# Patient Record
Sex: FEMALE | Race: WHITE | Employment: UNEMPLOYED | URBAN - METROPOLITAN AREA
[De-identification: names, ages, dates, MRNs, and addresses within clinical notes are randomized per-mention and may not be internally consistent; named-entity substitution may affect disease eponyms.]

---

## 2017-04-24 ENCOUNTER — OFFICE VISIT (OUTPATIENT)
Dept: FAMILY MEDICINE CLINIC | Age: 47
End: 2017-04-24

## 2017-04-24 VITALS
HEIGHT: 69 IN | SYSTOLIC BLOOD PRESSURE: 101 MMHG | HEART RATE: 71 BPM | DIASTOLIC BLOOD PRESSURE: 76 MMHG | OXYGEN SATURATION: 98 % | WEIGHT: 205 LBS | TEMPERATURE: 98.9 F | BODY MASS INDEX: 30.36 KG/M2 | RESPIRATION RATE: 16 BRPM

## 2017-04-24 DIAGNOSIS — R19.7 DIARRHEA, UNSPECIFIED TYPE: Primary | ICD-10-CM

## 2017-04-24 DIAGNOSIS — I10 ESSENTIAL HYPERTENSION: ICD-10-CM

## 2017-04-24 DIAGNOSIS — F41.1 GAD (GENERALIZED ANXIETY DISORDER): ICD-10-CM

## 2017-04-24 RX ORDER — DIPHENOXYLATE HYDROCHLORIDE AND ATROPINE SULFATE 2.5; .025 MG/1; MG/1
1 TABLET ORAL
Qty: 30 TAB | Refills: 0 | Status: SHIPPED | OUTPATIENT
Start: 2017-04-24 | End: 2017-08-18 | Stop reason: SDUPTHER

## 2017-04-24 RX ORDER — LORAZEPAM 0.5 MG/1
0.5 TABLET ORAL
Qty: 30 TAB | Refills: 2 | Status: SHIPPED | OUTPATIENT
Start: 2017-04-24 | End: 2017-08-30 | Stop reason: SDUPTHER

## 2017-04-24 RX ORDER — PAROXETINE 30 MG/1
TABLET, FILM COATED ORAL
Qty: 90 TAB | Refills: 1 | Status: SHIPPED | OUTPATIENT
Start: 2017-04-24 | End: 2017-08-29 | Stop reason: SDUPTHER

## 2017-04-24 NOTE — PROGRESS NOTES
1. Have you been to the ER, urgent care clinic since your last visit? Hospitalized since your last visit? No    2. Have you seen or consulted any other health care providers outside of the [address] [place] [address] since your last visit? Include any pap smears or colon screening. No   Chief Complaint   Patient presents with    Inflammatory Bowel Disease     Colitis    Medication Refill     Pt present to the office with med refill and IBS      Inc diarrhea and seeing GI in 10 days and needs something for it      Chief Complaint   Patient presents with    Inflammatory Bowel Disease     Colitis    Medication Refill     she is a 40y.o. year old female who presents for evalution. Reviewed PmHx, RxHx, FmHx, SocHx, AllgHx and updated and dated in the chart. Patient Active Problem List    Diagnosis    Vitamin D deficiency    Essential hypertension    DDD (degenerative disc disease), lumbar    Chronic back pain    Abdominal pain, LUQ (left upper quadrant)    Nausea & vomiting    Sciatica    Hypercholesterolemia       Review of Systems - negative except as listed above in the HPI    Objective:     Vitals:    04/24/17 1137   BP: 101/76   Pulse: 71   Resp: 16   Temp: 98.9 °F (37.2 °C)   TempSrc: Oral   SpO2: 98%   Weight: 205 lb (93 kg)   Height: 5' 9\" (1.753 m)     Physical Examination: General appearance - alert, well appearing, and in no distress  Chest - clear to auscultation, no wheezes, rales or rhonchi, symmetric air entry  Heart - normal rate, regular rhythm, normal S1, S2, no murmurs, rubs, clicks or gallops    Assessment/ Plan:   Sloane Fong was seen today for inflammatory bowel disease and medication refill. Diagnoses and all orders for this visit:    Diarrhea, unspecified type  -     diphenoxylate-atropine (LOMOTIL) 2.5-0.025 mg per tablet; Take 1 Tab by mouth four (4) times daily as needed for Diarrhea. Max Daily Amount: 4 Tabs.  Indications: Diarrhea  -add rx  -refer to GI    PAVEL (generalized anxiety disorder)  -     LORazepam (ATIVAN) 0.5 mg tablet; Take 1 Tab by mouth daily as needed for Anxiety. -     PARoxetine (PAXIL) 30 mg tablet; TAKE ONE TABLET BY MOUTH DAILY    Essential hypertension  -at goal       Follow-up Disposition:  Return if symptoms worsen or fail to improve. I have discussed the diagnosis with the patient and the intended plan as seen in the above orders. The patient understands and agrees with the plan. The patient has received an after-visit summary and questions were answered concerning future plans. Medication Side Effects and Warnings were discussed with patient  Patient Labs were reviewed and or requested:  Patient Past Records were reviewed and or requested    Danilo Patterson M.D. There are no Patient Instructions on file for this visit.

## 2017-04-24 NOTE — MR AVS SNAPSHOT
Visit Information Date & Time Provider Department Dept. Phone Encounter #  
 4/24/2017 11:20 AM Alpesh Umana MD [address] [facility] [phone] [id number] Follow-up Instructions Return if symptoms worsen or fail to improve. Upcoming Health Maintenance Date Due Pneumococcal 19-64 Medium Risk (1 of 1 - PPSV23) 4/28/1990 DTaP/Tdap/Td series (1 - Tdap) 4/28/1992 PAP AKA CERVICAL CYTOLOGY 8/18/2013 BREAST CANCER SCRN MAMMOGRAM 7/30/2017 Allergies as of 4/24/2017  Review Complete On: 4/24/2017 By: Alpesh Umana MD  
  
 Severity Noted Reaction Type Reactions Beef Containing Products High 10/14/2016    Anaphylaxis Percocet [Oxycodone-acetaminophen] High 11/12/2013    Itching Pt states not allergic to medication Codeine Medium 07/29/2010    Nausea and Vomiting Nuts [Tree Nut]  10/14/2016    Unknown (comments) Allergy tested. Pork Derived (Porcine)  10/14/2016    Unknown (comments) Allergy tested Current Immunizations  Never Reviewed Name Date Influenza Vaccine Split 1/7/2011 Not reviewed this visit You Were Diagnosed With   
  
 Codes Comments Diarrhea, unspecified type    -  Primary ICD-10-CM: R19.7 ICD-9-CM: 787.91   
 PVAEL (generalized anxiety disorder)     ICD-10-CM: F41.1 ICD-9-CM: 300.02 Essential hypertension     ICD-10-CM: I10 
ICD-9-CM: 401.9 Vitals BP Pulse Temp Resp Height(growth percentile) Weight(growth percentile) 101/76 (BP 1 Location: Right arm, BP Patient Position: Sitting) 71 98.9 °F (37.2 °C) (Oral) 16 5' 9\" (1.753 m) 205 lb (93 kg) SpO2 BMI OB Status Smoking Status 98% 30.27 kg/m2 Hysterectomy Current Every Day Smoker BMI and BSA Data Body Mass Index Body Surface Area  
 30.27 kg/m 2 2.13 m 2 Preferred Pharmacy Pharmacy Name Phone Maegan Cason [address], [address] [phone] Your Updated Medication List  
  
   
This list is accurate as of: 4/24/17 11:51 AM.  Always use your most recent med list.  
  
  
  
  
 albuterol 90 mcg/actuation inhaler Commonly known as:  VENTOLIN HFA INHALE ONE PUFF BY MOUTH EVERY 4 HOURS AS NEEDED FOR WHEEZING OR SHORTNESS OF BREATH  
  
 atorvastatin 40 mg tablet Commonly known as:  LIPITOR Blood-Glucose Meter monitoring kit Commonly known as:  CONTOUR METER Dx: 790.21  
  
 cholecalciferol (VITAMIN D3) 5,000 unit Tab tablet Commonly known as:  VITAMIN D3 Take  by mouth daily. diphenoxylate-atropine 2.5-0.025 mg per tablet Commonly known as:  LOMOTIL Take 1 Tab by mouth four (4) times daily as needed for Diarrhea. Max Daily Amount: 4 Tabs. Indications: Diarrhea  
  
 gabapentin 300 mg capsule Commonly known as:  NEURONTIN  
TAKE ONE CAPSULE BY MOUTH THREE TIMES A DAY  
  
 glucose blood VI test strips strip Commonly known as:  Ascensia CONTOUR Bid monitoring, dx: 790.21 LORazepam 0.5 mg tablet Commonly known as:  ATIVAN Take 1 Tab by mouth daily as needed for Anxiety. PARoxetine 30 mg tablet Commonly known as:  PAXIL TAKE ONE TABLET BY MOUTH DAILY  
  
 VITAMIN D2 50,000 unit capsule Generic drug:  ergocalciferol TAKE ONE CAPSULE BY MOUTH EVERY 7 DAYS. Prescriptions Printed Refills LORazepam (ATIVAN) 0.5 mg tablet 2 Sig: Take 1 Tab by mouth daily as needed for Anxiety. Class: Print Route: Oral  
 diphenoxylate-atropine (LOMOTIL) 2.5-0.025 mg per tablet 0 Sig: Take 1 Tab by mouth four (4) times daily as needed for Diarrhea. Max Daily Amount: 4 Tabs. Indications: Diarrhea Class: Print Route: Oral  
  
Prescriptions Sent to Pharmacy Refills PARoxetine (PAXIL) 30 mg tablet 1 Sig: TAKE ONE TABLET BY MOUTH DAILY Class: Normal  
 Pharmacy: [facility]autam [facility] [address], [address] Ph #: [phone] Follow-up Instructions Return if symptoms worsen or fail to improve. Introducing [facility] & HEALTH SERVICES! Dear [facility]: Thank you for requesting a [employer] account. Our records indicate that you already have an active [employer] account. You can access your account anytime at [email or web address]. [employer] Did you know that you can access your hospital and ER discharge instructions at any time in [employer]? You can also review all of your test results from your hospital stay or ER visit. Additional Information If you have questions, please visit the Frequently Asked Questions section of the [employer] website at [email or web address] Remember, [employer] is NOT to be used for urgent needs. For medical emergencies, dial 911. Now available from your iPhone and Android! Please provide this summary of care documentation to your next provider. Your primary care clinician is listed as Fredy Fu. If you have any questions after today's visit, please call [phone].

## 2017-05-08 ENCOUNTER — OP HISTORICAL/CONVERTED ENCOUNTER (OUTPATIENT)
Dept: OTHER | Age: 47
End: 2017-05-08

## 2017-05-31 RX ORDER — ZOLPIDEM TARTRATE 12.5 MG/1
TABLET, FILM COATED, EXTENDED RELEASE ORAL
Qty: 30 TAB | Refills: 1 | OUTPATIENT
Start: 2017-05-31 | End: 2017-08-30 | Stop reason: SDUPTHER

## 2017-06-01 NOTE — TELEPHONE ENCOUNTER
Medication has been called into pharmacy; unable to notify pt due to number on file is not in service.

## 2017-06-13 ENCOUNTER — DOCUMENTATION ONLY (OUTPATIENT)
Dept: FAMILY MEDICINE CLINIC | Age: 47
End: 2017-06-13

## 2017-06-13 NOTE — PROGRESS NOTES
[facility]'s cancer registry status request was put on [facility]'s desk to process

## 2017-08-18 DIAGNOSIS — R19.7 DIARRHEA, UNSPECIFIED TYPE: ICD-10-CM

## 2017-08-18 RX ORDER — DIPHENOXYLATE HYDROCHLORIDE AND ATROPINE SULFATE 2.5; .025 MG/1; MG/1
1 TABLET ORAL
Qty: 30 TAB | Refills: 0 | OUTPATIENT
Start: 2017-08-18 | End: 2018-12-26 | Stop reason: ALTCHOICE

## 2017-08-29 DIAGNOSIS — F41.1 GAD (GENERALIZED ANXIETY DISORDER): ICD-10-CM

## 2017-08-29 RX ORDER — PAROXETINE 30 MG/1
TABLET, FILM COATED ORAL
Qty: 90 TAB | Refills: 0 | Status: SHIPPED | OUTPATIENT
Start: 2017-08-29 | End: 2017-08-30 | Stop reason: ALTCHOICE

## 2017-08-30 ENCOUNTER — OFFICE VISIT (OUTPATIENT)
Dept: FAMILY MEDICINE CLINIC | Age: 47
End: 2017-08-30

## 2017-08-30 VITALS
HEART RATE: 73 BPM | OXYGEN SATURATION: 98 % | DIASTOLIC BLOOD PRESSURE: 92 MMHG | BODY MASS INDEX: 29.2 KG/M2 | WEIGHT: 197.13 LBS | SYSTOLIC BLOOD PRESSURE: 132 MMHG | RESPIRATION RATE: 16 BRPM | TEMPERATURE: 98.3 F | HEIGHT: 69 IN

## 2017-08-30 DIAGNOSIS — E78.00 HYPERCHOLESTEROLEMIA: Primary | ICD-10-CM

## 2017-08-30 DIAGNOSIS — E55.9 VITAMIN D DEFICIENCY: ICD-10-CM

## 2017-08-30 DIAGNOSIS — F41.1 GAD (GENERALIZED ANXIETY DISORDER): ICD-10-CM

## 2017-08-30 DIAGNOSIS — B00.9 HSV-1 (HERPES SIMPLEX VIRUS 1) INFECTION: ICD-10-CM

## 2017-08-30 RX ORDER — LISINOPRIL 10 MG/1
10 TABLET ORAL DAILY
Qty: 30 TAB | Refills: 5 | Status: SHIPPED | OUTPATIENT
Start: 2017-08-30 | End: 2018-10-30 | Stop reason: ALTCHOICE

## 2017-08-30 RX ORDER — ERGOCALCIFEROL 1.25 MG/1
CAPSULE ORAL
Qty: 4 CAP | Refills: 5 | Status: SHIPPED | OUTPATIENT
Start: 2017-08-30 | End: 2018-10-30 | Stop reason: SDUPTHER

## 2017-08-30 RX ORDER — DULOXETIN HYDROCHLORIDE 60 MG/1
60 CAPSULE, DELAYED RELEASE ORAL DAILY
Qty: 30 CAP | Refills: 5 | Status: SHIPPED | OUTPATIENT
Start: 2017-08-30 | End: 2018-10-30 | Stop reason: SDUPTHER

## 2017-08-30 RX ORDER — ZOLPIDEM TARTRATE 12.5 MG/1
TABLET, FILM COATED, EXTENDED RELEASE ORAL
Qty: 30 TAB | Refills: 1 | Status: SHIPPED | OUTPATIENT
Start: 2017-08-30 | End: 2018-10-30 | Stop reason: SDUPTHER

## 2017-08-30 RX ORDER — ATORVASTATIN CALCIUM 40 MG/1
40 TABLET, FILM COATED ORAL DAILY
Qty: 30 TAB | Refills: 5 | Status: SHIPPED | OUTPATIENT
Start: 2017-08-30 | End: 2018-10-30 | Stop reason: SDUPTHER

## 2017-08-30 RX ORDER — GABAPENTIN 300 MG/1
CAPSULE ORAL
Qty: 90 CAP | Refills: 3 | Status: SHIPPED | OUTPATIENT
Start: 2017-08-30 | End: 2018-10-30 | Stop reason: SDUPTHER

## 2017-08-30 RX ORDER — LORAZEPAM 0.5 MG/1
0.5 TABLET ORAL
Qty: 30 TAB | Refills: 2 | Status: SHIPPED | OUTPATIENT
Start: 2017-08-30 | End: 2018-10-30 | Stop reason: SDUPTHER

## 2017-08-30 RX ORDER — ALBUTEROL SULFATE 90 UG/1
AEROSOL, METERED RESPIRATORY (INHALATION)
Qty: 1 INHALER | Refills: 2 | Status: SHIPPED | OUTPATIENT
Start: 2017-08-30 | End: 2018-03-01 | Stop reason: SDUPTHER

## 2017-08-30 NOTE — PROGRESS NOTES
1. Have you been to the ER, urgent care clinic since your last visit? Hospitalized since your last visit? Pt 1st x 1 wk for right hand injury    2. Have you seen or consulted any other health care providers outside of the [address] [place] [address] since your last visit? Include any pap smears or colon screening. No    Chief Complaint   Patient presents with    Follow-up     4 mo f/u, discuss med change    Lesion     left arm x 1 yr     Pt states she has a lesion on the back of her left arm x 1 yr that she would like someone to look at.

## 2017-08-30 NOTE — Clinical Note
Patient has been on Effexor in the past and did not do well; insurance states that she must fail effexor, obviously has failed this med and Cymbalta should go through.

## 2017-08-30 NOTE — MR AVS SNAPSHOT
Visit Information Date & Time Provider Department Dept. Phone Encounter #  
 8/30/2017  1:45 PM Lulú Garcia NP [address] [facility] [phone] [id number] Follow-up Instructions Return for recheck 3 weeks for well exam, fasting labs and med check. Routing History Upcoming Health Maintenance Date Due Pneumococcal 19-64 Medium Risk (1 of 1 - PPSV23) 4/28/1990 DTaP/Tdap/Td series (1 - Tdap) 4/28/1992 PAP AKA CERVICAL CYTOLOGY 8/18/2013 BREAST CANCER SCRN MAMMOGRAM 7/30/2017 INFLUENZA AGE 9 TO ADULT 8/1/2017 Allergies as of 8/30/2017  Review Complete On: 8/30/2017 By: Shireen Decker LPN Severity Noted Reaction Type Reactions Beef Containing Products High 10/14/2016    Anaphylaxis Percocet [Oxycodone-acetaminophen] High 11/12/2013    Itching Pt states not allergic to medication Codeine Medium 07/29/2010    Nausea and Vomiting Nuts [Tree Nut]  10/14/2016    Unknown (comments) Allergy tested. Pork Derived (Porcine)  10/14/2016    Unknown (comments) Allergy tested Current Immunizations  Never Reviewed Name Date Influenza Vaccine Split 1/7/2011 Not reviewed this visit You Were Diagnosed With   
  
 Codes Comments Hypercholesterolemia    -  Primary ICD-10-CM: E78.00 ICD-9-CM: 272.0   
 PAVEL (generalized anxiety disorder)     ICD-10-CM: F41.1 ICD-9-CM: 300.02 HSV-1 (herpes simplex virus 1) infection     ICD-10-CM: B00.9 ICD-9-CM: 054.9 Vitamin D deficiency     ICD-10-CM: E55.9 ICD-9-CM: 268.9 Vitals BP Pulse Temp Resp Height(growth percentile) Weight(growth percentile) (!) 132/92 73 98.3 °F (36.8 °C) (Oral) 16 5' 9\" (1.753 m) 197 lb 2 oz (89.4 kg) SpO2 BMI OB Status Smoking Status 98% 29.11 kg/m2 Hysterectomy Current Every Day Smoker Vitals History BMI and BSA Data Body Mass Index Body Surface Area  
 29.11 kg/m 2 2.09 m 2 Preferred Pharmacy Pharmacy Name Phone [id number] - [address] [phone] Your Updated Medication List  
  
   
This list is accurate as of: 8/30/17  2:54 PM.  Always use your most recent med list.  
  
  
  
  
 albuterol 90 mcg/actuation inhaler Commonly known as:  VENTOLIN HFA INHALE ONE PUFF BY MOUTH EVERY 4 HOURS AS NEEDED FOR WHEEZING OR SHORTNESS OF BREATH  
  
 atorvastatin 40 mg tablet Commonly known as:  LIPITOR Take 1 Tab by mouth daily. diphenoxylate-atropine 2.5-0.025 mg per tablet Commonly known as:  LOMOTIL Take 1 Tab by mouth four (4) times daily as needed for Diarrhea. Max Daily Amount: 4 Tabs. Indications: Diarrhea DULoxetine 60 mg capsule Commonly known as:  CYMBALTA Take 1 Cap by mouth daily. ergocalciferol 50,000 unit capsule Commonly known as:  VITAMIN D2  
TAKE ONE CAPSULE BY MOUTH EVERY 7 DAYS. gabapentin 300 mg capsule Commonly known as:  NEURONTIN  
TAKE ONE CAPSULE BY MOUTH THREE TIMES A DAY  
  
 lisinopril 10 mg tablet Commonly known as:  Anastasiya Golder Take 1 Tab by mouth daily. LORazepam 0.5 mg tablet Commonly known as:  ATIVAN Take 1 Tab by mouth daily as needed for Anxiety. zolpidem CR 12.5 mg tablet Commonly known as:  AMBIEN CR  
TAKE ONE TABLET BY MOUTH EVERY NIGHT AT BEDTIME AS NEEDED FOR SLEEP Prescriptions Printed Refills LORazepam (ATIVAN) 0.5 mg tablet 2 Sig: Take 1 Tab by mouth daily as needed for Anxiety. Class: Print Route: Oral  
 zolpidem CR (AMBIEN CR) 12.5 mg tablet 1 Sig: TAKE ONE TABLET BY MOUTH EVERY NIGHT AT BEDTIME AS NEEDED FOR SLEEP Class: Print Prescriptions Sent to Pharmacy Refills DULoxetine (CYMBALTA) 60 mg capsule 5 Sig: Take 1 Cap by mouth daily.   
 Class: Normal  
 Pharmacy: [address] [place] [address] [address] [facility] Ph #: [phone] Route: Oral  
 gabapentin (NEURONTIN) 300 mg capsule 3 Sig: TAKE ONE CAPSULE BY MOUTH THREE TIMES A DAY Class: Normal  
 [id number] - [address]  Ph #: [phone]  
 ergocalciferol (VITAMIN D2) 50,000 unit capsule 5 Sig: TAKE ONE CAPSULE BY MOUTH EVERY 7 DAYS. Class: Normal  
 Pharmacy: [facility]/pharmacy #[id number] [address]  Ph #: [phone]  
 atorvastatin (LIPITOR) 40 mg tablet 5 Sig: Take 1 Tab by mouth daily. Class: Normal  
 [facility]  Ph #: [phone] Route: Oral  
 albuterol (VENTOLIN HFA) 90 mcg/actuation inhaler 2 Sig: INHALE ONE PUFF BY MOUTH EVERY 4 HOURS AS NEEDED FOR WHEEZING OR SHORTNESS OF BREATH Class: Normal  
 [id number] Ph #: [phone]  
 lisinopril (PRINIVIL, ZESTRIL) 10 mg tablet 5 Sig: Take 1 Tab by mouth daily. Class: Normal  
 [facility] Ph #: [phone] Route: Oral  
  
Follow-up Instructions Return for recheck 3 weeks for well exam, fasting labs and med check. Introducing [facility] & HEALTH SERVICES! Dear Abner Lennox: Thank you for requesting a [employer] account. Our records indicate that you already have an active [employer] account. You can access your account anytime at [email or web address] Did you know that you can access your hospital and ER discharge instructions at any time in [employer]? You can also review all of your test results from your hospital stay or ER visit. Additional Information If you have questions, please visit the Frequently Asked Questions section of the [employer] website at [email or web address] Remember, [employer] is NOT to be used for urgent needs. For medical emergencies, dial 911. Now available from your iPhone and Android! Please provide this summary of care documentation to your next provider. Your primary care clinician is listed as Teri Luna. If you have any questions after today's visit, please call [phone].

## 2017-08-31 ENCOUNTER — DOCUMENTATION ONLY (OUTPATIENT)
Dept: FAMILY MEDICINE CLINIC | Age: 47
End: 2017-08-31

## 2017-08-31 NOTE — PROGRESS NOTES
HISTORY OF PRESENT ILLNESS  Drake Boles is a 56 y.o. female. HPI  She is here today to discuss her meds and make some changes  Anxiety and depression is out of control, no improvement in sx  Has been on paxil for years and making her hair fall out  Admits she has tried most SSRI meds and make her tired  Stressed with living arrangement    Has been out of meds for anxiety and vit d/cholesterol  Having fatigue and body aches    ROS  A comprehensive review of system was obtained and negative except findings in the HPI    Visit Vitals    BP (!) 132/92    Pulse 73    Temp 98.3 °F (36.8 °C) (Oral)    Resp 16    Ht 5' 9\" (1.753 m)    Wt 197 lb 2 oz (89.4 kg)    SpO2 98%    BMI 29.11 kg/m2     Physical Exam   Constitutional: She is oriented to person, place, and time. She appears well-developed and well-nourished. Neck: No JVD present. Cardiovascular: Normal rate, regular rhythm and intact distal pulses. Exam reveals no gallop and no friction rub. No murmur heard. Pulmonary/Chest: Effort normal and breath sounds normal. No respiratory distress. She has no wheezes. Musculoskeletal: She exhibits no edema. Neurological: She is alert and oriented to person, place, and time. Skin: Skin is warm. Nursing note and vitals reviewed. ASSESSMENT and PLAN  Encounter Diagnoses   Name Primary?     Hypercholesterolemia Yes    PAVEL (generalized anxiety disorder)     HSV-1 (herpes simplex virus 1) infection     Vitamin D deficiency      Orders Placed This Encounter    DULoxetine (CYMBALTA) 60 mg capsule    LORazepam (ATIVAN) 0.5 mg tablet    zolpidem CR (AMBIEN CR) 12.5 mg tablet    gabapentin (NEURONTIN) 300 mg capsule    ergocalciferol (VITAMIN D2) 50,000 unit capsule    atorvastatin (LIPITOR) 40 mg tablet    albuterol (VENTOLIN HFA) 90 mcg/actuation inhaler    lisinopril (PRINIVIL, ZESTRIL) 10 mg tablet     Change paxil to cymbalta  Adr/se reviewed and what to expect  May add lamictal to her regimen given response in 3 weeks  All meds refilled to her pharmacy on file  Recheck labs and meds in 3 weeks  I have discussed the diagnosis with the patient and the intended plan as seen in the above orders. The patient has received an after-visit summary and questions were answered concerning future plans. Patient conveyed understanding of the plan at the time of the visit.     Anni Moyer MSN, ANP  8/30/2017

## 2017-09-01 NOTE — PROGRESS NOTES
Faxed completed form to [facility] [employer] @ [phone] w/ confirmation.

## 2018-01-05 ENCOUNTER — DOCUMENTATION ONLY (OUTPATIENT)
Dept: FAMILY MEDICINE CLINIC | Age: 48
End: 2018-01-05

## 2018-01-05 NOTE — PROGRESS NOTES
Rec'd medical records request from [address] [place], faxed request to [employer] for processing on 01/04/18, confirmation rec'd.

## 2018-01-05 NOTE — PROGRESS NOTES
Rec'd medical records request from, [facility] faxed request to [facility] for processing on 01/04/18, confirmation rec'd.

## 2018-01-19 ENCOUNTER — DOCUMENTATION ONLY (OUTPATIENT)
Dept: FAMILY MEDICINE CLINIC | Age: 48
End: 2018-01-19

## 2018-01-19 NOTE — PROGRESS NOTES
Refaxed request from Renata Paredes [facility] [employer]. They Stated they didn't rec'd all 4 pages.

## 2018-03-01 RX ORDER — ALBUTEROL SULFATE 90 UG/1
AEROSOL, METERED RESPIRATORY (INHALATION)
Qty: 1 INHALER | Refills: 0 | Status: SHIPPED | OUTPATIENT
Start: 2018-03-01 | End: 2022-06-22 | Stop reason: ALTCHOICE

## 2018-10-30 ENCOUNTER — OFFICE VISIT (OUTPATIENT)
Dept: FAMILY MEDICINE CLINIC | Age: 48
End: 2018-10-30

## 2018-10-30 VITALS
OXYGEN SATURATION: 98 % | TEMPERATURE: 98.7 F | DIASTOLIC BLOOD PRESSURE: 72 MMHG | BODY MASS INDEX: 28.85 KG/M2 | HEART RATE: 87 BPM | RESPIRATION RATE: 18 BRPM | HEIGHT: 69 IN | WEIGHT: 194.8 LBS | SYSTOLIC BLOOD PRESSURE: 112 MMHG

## 2018-10-30 DIAGNOSIS — J40 BRONCHITIS: ICD-10-CM

## 2018-10-30 DIAGNOSIS — G47.00 INSOMNIA, UNSPECIFIED TYPE: Primary | ICD-10-CM

## 2018-10-30 DIAGNOSIS — B00.9 HSV-1 (HERPES SIMPLEX VIRUS 1) INFECTION: ICD-10-CM

## 2018-10-30 DIAGNOSIS — Z23 ENCOUNTER FOR IMMUNIZATION: ICD-10-CM

## 2018-10-30 DIAGNOSIS — F41.1 GAD (GENERALIZED ANXIETY DISORDER): ICD-10-CM

## 2018-10-30 RX ORDER — DULOXETIN HYDROCHLORIDE 60 MG/1
60 CAPSULE, DELAYED RELEASE ORAL DAILY
Qty: 30 CAP | Refills: 5 | Status: SHIPPED | OUTPATIENT
Start: 2018-10-30 | End: 2019-01-28 | Stop reason: SDUPTHER

## 2018-10-30 RX ORDER — ZOLPIDEM TARTRATE 12.5 MG/1
TABLET, FILM COATED, EXTENDED RELEASE ORAL
Qty: 30 TAB | Refills: 2 | Status: SHIPPED | OUTPATIENT
Start: 2018-10-30 | End: 2019-03-05 | Stop reason: SDUPTHER

## 2018-10-30 RX ORDER — ERGOCALCIFEROL 1.25 MG/1
CAPSULE ORAL
Qty: 4 CAP | Refills: 5 | Status: SHIPPED | OUTPATIENT
Start: 2018-10-30 | End: 2019-11-26 | Stop reason: SDUPTHER

## 2018-10-30 RX ORDER — LORAZEPAM 0.5 MG/1
0.5 TABLET ORAL 2 TIMES DAILY
Qty: 60 TAB | Refills: 2 | Status: SHIPPED | OUTPATIENT
Start: 2018-10-30 | End: 2019-03-05 | Stop reason: SDUPTHER

## 2018-10-30 RX ORDER — AZITHROMYCIN 250 MG/1
TABLET, FILM COATED ORAL
Qty: 6 TAB | Refills: 0 | Status: SHIPPED | OUTPATIENT
Start: 2018-10-30 | End: 2018-11-04

## 2018-10-30 RX ORDER — GABAPENTIN 300 MG/1
CAPSULE ORAL
Qty: 90 CAP | Refills: 3 | Status: SHIPPED | OUTPATIENT
Start: 2018-10-30 | End: 2019-05-14 | Stop reason: SDUPTHER

## 2018-10-30 RX ORDER — ARIPIPRAZOLE 10 MG/1
10 TABLET ORAL DAILY
Qty: 30 TAB | Refills: 5 | Status: SHIPPED | OUTPATIENT
Start: 2018-10-30 | End: 2018-12-26 | Stop reason: ALTCHOICE

## 2018-10-30 RX ORDER — ATORVASTATIN CALCIUM 40 MG/1
40 TABLET, FILM COATED ORAL DAILY
Qty: 30 TAB | Refills: 5 | Status: SHIPPED | OUTPATIENT
Start: 2018-10-30 | End: 2019-05-09 | Stop reason: SDUPTHER

## 2018-10-30 RX ORDER — EPINEPHRINE 0.3 MG/.3ML
0.3 INJECTION SUBCUTANEOUS
Qty: 0.6 ML | Refills: 1 | Status: SHIPPED | OUTPATIENT
Start: 2018-10-30 | End: 2019-11-01 | Stop reason: SDUPTHER

## 2018-10-30 NOTE — PROGRESS NOTES
HISTORY OF PRESENT ILLNESS  Melissa Lacy is a 53 y.o. female. HPI  Chief Complaint   Patient presents with    Medication Refill     Cymbalta and all other psych meds  Lived in [place] the last year and has maintained her medications that she was on before she moved, anxiety is even worse now living with sister who needed in home rehab from car accident. Would like to add something like an Abilify to help with anxiety as well    Cold Symptoms     chest congestion, productive cough, sneezing, OTC Zycam taken  Deep chest congestion and cough     Patient is here today for flu shot. Denies any previous adr/se to the flu shot, no egg allergy and no recent illness or fever. ROS  A comprehensive review of system was obtained and negative except findings in the HPI    Visit Vitals  /72 (BP 1 Location: Left arm, BP Patient Position: Sitting)   Pulse 87   Temp 98.7 °F (37.1 °C) (Oral)   Resp 18   Ht 5' 9\" (1.753 m)   Wt 194 lb 12.8 oz (88.4 kg)   SpO2 98%   BMI 28.77 kg/m²     Physical Exam   Constitutional: She is oriented to person, place, and time. She appears well-developed and well-nourished. No distress. HENT:   Right Ear: External ear normal.   Left Ear: External ear normal.   Mouth/Throat: Oropharyngeal exudate present. Cardiovascular: Normal rate and regular rhythm. No murmur heard. Pulmonary/Chest: Breath sounds normal. No respiratory distress. She has no wheezes. She has no rales. Musculoskeletal: She exhibits no edema. Lymphadenopathy:     She has cervical adenopathy. Neurological: She is alert and oriented to person, place, and time. Nursing note and vitals reviewed. ASSESSMENT and PLAN  Encounter Diagnoses   Name Primary?     Insomnia, unspecified type Yes    PAVEL (generalized anxiety disorder)     Bronchitis     Encounter for immunization      Orders Placed This Encounter    Influenza virus vaccine (QUADRIVALENT PRES FREE SYRINGE) IM ([id number])    LORazepam (ATIVAN) 0.5 mg tablet    zolpidem CR (AMBIEN CR) 12.5 mg tablet    ARIPiprazole (ABILIFY) 10 mg tablet    DULoxetine (CYMBALTA) 60 mg capsule    gabapentin (NEURONTIN) 300 mg capsule    ergocalciferol (VITAMIN D2) 50,000 unit capsule    atorvastatin (LIPITOR) 40 mg tablet    azithromycin (ZITHROMAX) 250 mg tablet    EPINEPHrine (EPIPEN 2-YUNIER) 0.3 mg/0.3 mL injection     Refilled all meds on file for anxiety  Added abilify at night, will continue cymbalta and ambien/ativan prn  Flu shot given  zpack given for bronchitis  Reveiwed adr/se of medication  Push fluids, rest, suggested mucinex for congestion and drainage  Recheck 5-7 days if sx not improved. Follow-up Disposition:  Return in about 3 weeks (around 11/20/2018). I have discussed the diagnosis with the patient and the intended plan as seen in the above orders. The patient has received an after-visit summary and questions were answered concerning future plans. Patient conveyed understanding of the plan at the time of the visit.     Sheyla Dunlap, MSN, ANP  10/30/2018

## 2018-12-26 ENCOUNTER — OFFICE VISIT (OUTPATIENT)
Dept: FAMILY MEDICINE CLINIC | Age: 48
End: 2018-12-26

## 2018-12-26 VITALS
TEMPERATURE: 98.2 F | SYSTOLIC BLOOD PRESSURE: 115 MMHG | WEIGHT: 199 LBS | OXYGEN SATURATION: 97 % | BODY MASS INDEX: 29.47 KG/M2 | RESPIRATION RATE: 19 BRPM | HEART RATE: 76 BPM | DIASTOLIC BLOOD PRESSURE: 80 MMHG | HEIGHT: 69 IN

## 2018-12-26 DIAGNOSIS — F32.A DEPRESSION, UNSPECIFIED DEPRESSION TYPE: ICD-10-CM

## 2018-12-26 DIAGNOSIS — F41.1 GAD (GENERALIZED ANXIETY DISORDER): Primary | ICD-10-CM

## 2018-12-26 RX ORDER — LAMOTRIGINE 25 MG/1
50 TABLET ORAL DAILY
Qty: 60 TAB | Refills: 5 | Status: SHIPPED | OUTPATIENT
Start: 2018-12-26 | End: 2019-01-28 | Stop reason: SDUPTHER

## 2018-12-26 RX ORDER — BISMUTH SUBSALICYLATE 262 MG
1 TABLET,CHEWABLE ORAL DAILY
COMMUNITY
End: 2022-06-22 | Stop reason: ALTCHOICE

## 2018-12-26 NOTE — PROGRESS NOTES
Chief Complaint   Patient presents with    Medication Evaluation     abilify, states that she discontinued after a couple of doses. states that it caused a \"face HA\".  Fatigue     would like to discuss b12 injections. states that she never feels rested and that it takes a long time for her to get motivated. has seen rheum and labs, has not received results.

## 2018-12-26 NOTE — PROGRESS NOTES
HISTORY OF PRESENT ILLNESS  Matt Bragg is a 53 y.o. female. HPI  Chief Complaint   Patient presents with    Medication Evaluation     abilify, states that she discontinued after a couple of doses. states that it caused a \"face HA\". she continues to take the cymbalta and ativan prn.  Fatigue     would like to discuss b12 injections. states that she never feels rested and that it takes a long time for her to get motivated. has seen rheum and labs, has not received results. ROS  A comprehensive review of system was obtained and negative except findings in the HPI    Visit Vitals  /80   Pulse 76   Temp 98.2 °F (36.8 °C)   Resp 19   Ht 5' 9\" (1.753 m)   Wt 199 lb (90.3 kg)   SpO2 97%   BMI 29.39 kg/m²     Physical Exam   Constitutional: She is oriented to person, place, and time. She appears well-developed and well-nourished. Neck: No JVD present. Cardiovascular: Normal rate, regular rhythm and intact distal pulses. Exam reveals no gallop and no friction rub. No murmur heard. Pulmonary/Chest: Effort normal and breath sounds normal. No respiratory distress. She has no wheezes. Musculoskeletal: She exhibits no edema. Neurological: She is alert and oriented to person, place, and time. Skin: Skin is warm. Nursing note and vitals reviewed. ASSESSMENT and PLAN  Encounter Diagnoses   Name Primary?  PAVEL (generalized anxiety disorder) Yes    Depression, unspecified depression type      Orders Placed This Encounter    multivitamin (ONE A DAY) tablet    lamoTRIgine (LAMICTAL) 25 mg tablet     Change to Lamictal 25mg a day then 50mg after a week  Stay on cymbalta and ativan prn  Recheck 3 weeks    I have discussed the diagnosis with the patient and the intended plan as seen in the above orders. The patient has received an after-visit summary and questions were answered concerning future plans. Patient conveyed understanding of the plan at the time of the visit.     Maryellen WRIGHT Kirsten Hernandez, MSN, ANP  12/26/2018

## 2019-01-28 RX ORDER — LAMOTRIGINE 25 MG/1
TABLET ORAL
Qty: 180 TAB | Refills: 5 | Status: SHIPPED | OUTPATIENT
Start: 2019-01-28 | End: 2019-03-05 | Stop reason: ALTCHOICE

## 2019-01-28 RX ORDER — DULOXETIN HYDROCHLORIDE 60 MG/1
CAPSULE, DELAYED RELEASE ORAL
Qty: 90 CAP | Refills: 5 | Status: SHIPPED | OUTPATIENT
Start: 2019-01-28 | End: 2019-11-26 | Stop reason: SDUPTHER

## 2019-02-28 ENCOUNTER — TELEPHONE (OUTPATIENT)
Dept: FAMILY MEDICINE CLINIC | Age: 49
End: 2019-02-28

## 2019-02-28 DIAGNOSIS — K58.0 IRRITABLE BOWEL SYNDROME WITH DIARRHEA: Primary | ICD-10-CM

## 2019-02-28 NOTE — TELEPHONE ENCOUNTER
----- Message from [place] sent at 2/27/2019  6:27 PM EST -----  Regarding: RUSTY Tafoya/Mahi  Pt is requesting a referral for a Gastroenterologist.    Marquis Foss contact # [phone]

## 2019-03-04 NOTE — TELEPHONE ENCOUNTER
Faxed referral form to dr Maynor Plummer @ [phone]-. Confirmation number [id number]. Original form placed in scan folder for central scanning.

## 2019-03-04 NOTE — TELEPHONE ENCOUNTER
Pt states she has leaky gut syndrome diagnosed years ago. Pt states it has gotten worse, every time she eats she has cramps and dirreha and has burning. Pt states she has had numerous gastro issues.

## 2019-03-04 NOTE — TELEPHONE ENCOUNTER
I still need to know the reason for the referral to be able to order it and fax it to Dr. Roberto Osorio office.  Alyx Strange

## 2019-03-05 ENCOUNTER — OFFICE VISIT (OUTPATIENT)
Dept: FAMILY MEDICINE CLINIC | Age: 49
End: 2019-03-05

## 2019-03-05 VITALS
SYSTOLIC BLOOD PRESSURE: 126 MMHG | OXYGEN SATURATION: 96 % | BODY MASS INDEX: 30.81 KG/M2 | DIASTOLIC BLOOD PRESSURE: 80 MMHG | TEMPERATURE: 98.8 F | HEIGHT: 69 IN | HEART RATE: 76 BPM | WEIGHT: 208 LBS | RESPIRATION RATE: 12 BRPM

## 2019-03-05 DIAGNOSIS — K21.9 GASTROESOPHAGEAL REFLUX DISEASE WITHOUT ESOPHAGITIS: ICD-10-CM

## 2019-03-05 DIAGNOSIS — K52.9 COLITIS: Primary | ICD-10-CM

## 2019-03-05 DIAGNOSIS — G47.00 INSOMNIA, UNSPECIFIED TYPE: ICD-10-CM

## 2019-03-05 DIAGNOSIS — F41.1 GAD (GENERALIZED ANXIETY DISORDER): ICD-10-CM

## 2019-03-05 RX ORDER — LORAZEPAM 0.5 MG/1
0.5 TABLET ORAL 2 TIMES DAILY
Qty: 60 TAB | Refills: 2 | Status: SHIPPED | OUTPATIENT
Start: 2019-03-05 | End: 2019-10-21 | Stop reason: SDUPTHER

## 2019-03-05 RX ORDER — PANTOPRAZOLE SODIUM 40 MG/1
TABLET, DELAYED RELEASE ORAL
Qty: 90 TAB | Refills: 5 | Status: SHIPPED | OUTPATIENT
Start: 2019-03-05 | End: 2019-08-26 | Stop reason: ALTCHOICE

## 2019-03-05 RX ORDER — ZOLPIDEM TARTRATE 12.5 MG/1
TABLET, FILM COATED, EXTENDED RELEASE ORAL
Qty: 30 TAB | Refills: 2 | Status: SHIPPED | OUTPATIENT
Start: 2019-03-05 | End: 2019-10-21 | Stop reason: SDUPTHER

## 2019-03-05 RX ORDER — PANTOPRAZOLE SODIUM 40 MG/1
40 TABLET, DELAYED RELEASE ORAL DAILY
Qty: 30 TAB | Refills: 5 | Status: SHIPPED | OUTPATIENT
Start: 2019-03-05 | End: 2019-03-05 | Stop reason: SDUPTHER

## 2019-03-05 RX ORDER — CIPROFLOXACIN 500 MG/1
500 TABLET ORAL 2 TIMES DAILY
Qty: 20 TAB | Refills: 0 | Status: SHIPPED | OUTPATIENT
Start: 2019-03-05 | End: 2019-03-15

## 2019-03-05 NOTE — PROGRESS NOTES
Chief Complaint   Patient presents with    Medication Refill     Pt in office today for medication refill  -    Pt states she has been having issues with her digestive system  -informed pt that form was faxed and gave her hard copy  Pt wants to discuss fatty liver       Pt has no other concerns

## 2019-03-13 NOTE — PROGRESS NOTES
HISTORY OF PRESENT ILLNESS  Davey Lancaster is a 53 y.o. female. HPI  Patient here for refills of ambien for sleep  Anxiety is getting worse and having panic attacks  Causing her colitis to be worse as well and having GERD sx  Left lower quad pain and loose stools also noted  She is already managed on cymbalta 60mg a day    ROS  A comprehensive review of system was obtained and negative except findings in the HPI    Visit Vitals  /80 (BP 1 Location: Left arm, BP Patient Position: Sitting)   Pulse 76   Temp 98.8 °F (37.1 °C) (Oral)   Resp 12   Ht 5' 9\" (1.753 m)   Wt 208 lb (94.3 kg)   SpO2 96%   BMI 30.72 kg/m²     Physical Exam   Constitutional: She is oriented to person, place, and time. She appears well-developed and well-nourished. Neck: No JVD present. Cardiovascular: Normal rate, regular rhythm and intact distal pulses. Exam reveals no gallop and no friction rub. No murmur heard. Pulmonary/Chest: Effort normal and breath sounds normal. No respiratory distress. She has no wheezes. Abdominal: Bowel sounds are normal. There is tenderness. Musculoskeletal: She exhibits no edema. Neurological: She is alert and oriented to person, place, and time. Skin: Skin is warm. Nursing note and vitals reviewed. ASSESSMENT and PLAN  Encounter Diagnoses   Name Primary?  Colitis Yes    PAVEL (generalized anxiety disorder)     Insomnia, unspecified type     Gastroesophageal reflux disease without esophagitis      Orders Placed This Encounter    ciprofloxacin HCl (CIPRO) 500 mg tablet    LORazepam (ATIVAN) 0.5 mg tablet    zolpidem CR (AMBIEN CR) 12.5 mg tablet    pantoprazole (PROTONIX) 40 mg tablet     Given Protonix 40mg a day  Start Ativan for use sparingly for panic  Start cipro for colitis sx  Reviewed triggers    I have discussed the diagnosis with the patient and the intended plan as seen in the above orders.  The patient has received an after-visit summary and questions were answered concerning future plans. Patient conveyed understanding of the plan at the time of the visit.     Serena Nicholson MSN, ANP  3/12/2019

## 2019-03-27 ENCOUNTER — DOCUMENTATION ONLY (OUTPATIENT)
Dept: FAMILY MEDICINE CLINIC | Age: 49
End: 2019-03-27

## 2019-05-09 RX ORDER — ATORVASTATIN CALCIUM 40 MG/1
TABLET, FILM COATED ORAL
Qty: 90 TAB | Refills: 3 | Status: SHIPPED | OUTPATIENT
Start: 2019-05-09 | End: 2019-08-28 | Stop reason: SDUPTHER

## 2019-05-14 DIAGNOSIS — B00.9 HSV-1 (HERPES SIMPLEX VIRUS 1) INFECTION: ICD-10-CM

## 2019-05-15 RX ORDER — GABAPENTIN 300 MG/1
CAPSULE ORAL
Qty: 90 CAP | Refills: 0 | Status: SHIPPED | OUTPATIENT
Start: 2019-05-15 | End: 2019-08-26 | Stop reason: ALTCHOICE

## 2019-08-26 ENCOUNTER — OFFICE VISIT (OUTPATIENT)
Dept: FAMILY MEDICINE CLINIC | Age: 49
End: 2019-08-26

## 2019-08-26 VITALS
HEIGHT: 69 IN | DIASTOLIC BLOOD PRESSURE: 84 MMHG | RESPIRATION RATE: 18 BRPM | SYSTOLIC BLOOD PRESSURE: 121 MMHG | OXYGEN SATURATION: 95 % | HEART RATE: 74 BPM | WEIGHT: 211 LBS | BODY MASS INDEX: 31.25 KG/M2 | TEMPERATURE: 97.7 F

## 2019-08-26 DIAGNOSIS — M71.21 SYNOVIAL CYST OF RIGHT POPLITEAL SPACE: ICD-10-CM

## 2019-08-26 DIAGNOSIS — M79.7 FIBROMYALGIA: ICD-10-CM

## 2019-08-26 DIAGNOSIS — K76.0 FATTY LIVER DISEASE, NONALCOHOLIC: ICD-10-CM

## 2019-08-26 DIAGNOSIS — E78.00 HYPERCHOLESTEROLEMIA: ICD-10-CM

## 2019-08-26 DIAGNOSIS — E55.9 VITAMIN D DEFICIENCY: ICD-10-CM

## 2019-08-26 DIAGNOSIS — Z00.00 WELL ADULT EXAM: Primary | ICD-10-CM

## 2019-08-26 RX ORDER — DICLOFENAC SODIUM 75 MG/1
TABLET, DELAYED RELEASE ORAL
Qty: 180 TAB | Refills: 2 | Status: SHIPPED | OUTPATIENT
Start: 2019-08-26 | End: 2020-03-30

## 2019-08-26 RX ORDER — DICLOFENAC SODIUM 75 MG/1
75 TABLET, DELAYED RELEASE ORAL 2 TIMES DAILY
Qty: 60 TAB | Refills: 2 | Status: SHIPPED | OUTPATIENT
Start: 2019-08-26 | End: 2019-08-26 | Stop reason: SDUPTHER

## 2019-08-26 RX ORDER — GABAPENTIN 300 MG/1
300 CAPSULE ORAL 2 TIMES DAILY
Qty: 60 CAP | Refills: 5 | Status: SHIPPED | OUTPATIENT
Start: 2019-08-26 | End: 2021-04-01 | Stop reason: ALTCHOICE

## 2019-08-26 NOTE — PROGRESS NOTES
Chief Complaint   Patient presents with    Blood Clot    Medication Evaluation     Pt in office to check to see if she has a blood clot  -pt here for med check  -pt reports not being able to feel the bottom of her right foot (just noticed last week)  1. Have you been to the ER, urgent care clinic since your last visit? Hospitalized since your last visit? No    2. Have you seen or consulted any other health care providers outside of the [address] [place] [address] since your last visit? Include any pap smears or colon screening.  No     Pt reports taking uric acid and tina   Pt has no other concerns

## 2019-08-26 NOTE — PATIENT INSTRUCTIONS
Baker's Cyst: Care Instructions  Your Care Instructions    A Baker's cyst is a swelling behind the knee. It may cause pain or stiffness when you bend your knee or straighten it all the way. Baker's cysts are also called popliteal cysts. If you have arthritis or another condition that is the cause of the Baker's cyst, your doctor may treat that condition. A Baker's cyst may go away on its own. If not, or if it is causing a lot of discomfort, your doctor may drain the fluid that has built up behind the knee. In some cases, a Baker's cyst is removed in surgery. There are things you can do at home, such as staying off your leg, to reduce the swelling and pain. Follow-up care is a key part of your treatment and safety. Be sure to make and go to all appointments, and call your doctor if you are having problems. It's also a good idea to know your test results and keep a list of the medicines you take. How can you care for yourself at home? · Rest your knee as much as possible. · Ask your doctor if you can take an over-the-counter pain medicine, such as acetaminophen (Tylenol), ibuprofen (Advil, Motrin), or naproxen (Aleve). Be safe with medicines. Read and follow all instructions on the label. · Use a cane, a crutch, a walker, or another device if you need help to get around. These can help rest your knees. · If you have an elastic bandage, make sure it is snug but not so tight that your leg is numb, tingles, or swells below the bandage. Ask your doctor if you can loosen the bandage if it is too tight. · Follow your doctor's instructions about how much weight you can put on your knee. · Stay at a healthy weight. Being overweight puts extra strain on your knee. When should you call for help? Call 911 anytime you think you may need emergency care.  For example, call if:    · You have chest pain, are short of breath, or you cough up blood.    Call your doctor now or seek immediate medical care if:    · You have new or worse pain.     · Your foot is cool or pale or changes color.     · You have tingling, weakness, or numbness in your foot or toes.     · You have signs of a blood clot in your leg (called a deep vein thrombosis), such as:  ? Pain in your calf, back of the knee, thigh, or groin. ? Redness or swelling in your leg.    Watch closely for changes in your health, and be sure to contact your doctor if:    · You do not get better as expected. Where can you learn more? Go to http://madelyn-bipin.info/. Enter T805 in the search box to learn more about \"Baker's Cyst: Care Instructions. \"  Current as of: September 20, 2018  Content Version: 12.1  © 6826-9031 [employer]. Care instructions adapted under license by [employer] (which disclaims liability or warranty for this information). If you have questions about a medical condition or this instruction, always ask your healthcare professional. [place] [address] any warranty or liability for your use of this information.

## 2019-08-26 NOTE — PROGRESS NOTES
Subjective:   51 y.o. female for Medicare Well Exam  Her gyne and breast care is done elsewhere by her Ob-Gyne physician. She does not drink etoh  No memory concerns  Lives alone with sister, feels safe in the home, no safety equip in the home  No hearing concerns    Patient Active Problem List    Diagnosis Date Noted    Vitamin D deficiency 11/10/2015    Essential hypertension 07/15/2015    DDD (degenerative disc disease), lumbar 10/30/2014    Chronic back pain 10/30/2014    Abdominal pain, LUQ (left upper quadrant) 01/17/2012    Nausea & vomiting 01/17/2012    Sciatica 01/07/2011    Hypercholesterolemia 01/07/2011     Current Outpatient Medications   Medication Sig Dispense Refill    glucosam/chond-msm1/C/isabel/bor (GLUCOSAMINE-CHOND-MSM COMPLEX PO) Take  by mouth.  gabapentin (NEURONTIN) 300 mg capsule Take 1 Cap by mouth two (2) times a day. Max Daily Amount: 600 mg. TAKE ONE CAPSULE BY MOUTH THREE TIMES DAILY 60 Cap 5    atorvastatin (LIPITOR) 40 mg tablet TAKE 1 TABLET BY MOUTH EVERY DAY 90 Tab 3    LORazepam (ATIVAN) 0.5 mg tablet Take 1 Tab by mouth two (2) times a day. - must last 30 days 60 Tab 2    zolpidem CR (AMBIEN CR) 12.5 mg tablet TAKE ONE TABLET BY MOUTH EVERY NIGHT AT BEDTIME AS NEEDED FOR SLEEP - must last 30 days 30 Tab 2    DULoxetine (CYMBALTA) 60 mg capsule TAKE ONE CAPSULE BY MOUTH EVERY DAY 90 Cap 5    multivitamin (ONE A DAY) tablet Take 1 Tab by mouth daily.       ergocalciferol (VITAMIN D2) 50,000 unit capsule TAKE ONE CAPSULE BY MOUTH EVERY 7 DAYS. 4 Cap 5    VENTOLIN HFA 90 mcg/actuation inhaler INHALE 1 PUFF BY MOUTH EVERY 4 HOURS AS NEEDED 1 Inhaler 0    diclofenac EC (VOLTAREN) 75 mg EC tablet TAKE 1 TABLET BY MOUTH TWICE DAILY AS NEEDED FOR JOINT PAIN 180 Tab 2     Family History   Problem Relation Age of Onset    Cancer Mother         melenoma    Cancer Father         melenoma    Cancer Brother         melenoma    Cancer Maternal Grandmother uterine    Cancer Paternal Grandfather         lung    Cancer Other 32        niece has breast cancer     Social History     Tobacco Use    Smoking status: Current Every Day Smoker     Packs/day: 0.50     Years: 28.00     Pack years: 14.00     Types: Cigarettes    Smokeless tobacco: Former User     Quit date: 1/4/2013   Substance Use Topics    Alcohol use: No     Alcohol/week: 0.0 standard drinks         ROS: Feeling generally well. No TIA's or unusual headaches, no dysphagia. No prolonged cough. No dyspnea or chest pain on exertion. No abdominal pain, change in bowel habits, black or bloody stools. No urinary tract symptoms. No new or unusual musculoskeletal symptoms. Specific concerns today: having increase in fibromyalgia pain, right back of knee pain, anxiety seems to be worse. Objective: The patient appears well, alert, oriented x 3, in no distress. Visit Vitals  /84 (BP 1 Location: Left arm, BP Patient Position: Sitting)   Pulse 74   Temp 97.7 °F (36.5 °C)   Resp 18   Ht 5' 9\" (1.753 m)   Wt 211 lb (95.7 kg)   SpO2 95%   BMI 31.16 kg/m²     ENT normal.  Neck supple. No adenopathy or thyromegaly. JHOANA. Lungs are clear, good air entry, no wheezes, rhonchi or rales. S1 and S2 normal, no murmurs, regular rate and rhythm. Abdomen soft without tenderness, guarding, mass or organomegaly. Extremities show no edema, normal peripheral pulses. Neurological is normal, no focal findings. Breast and Pelvic exams are deferred. Assessment/Plan:   Well Woman  lose weight, increase physical activity, follow low fat diet, follow low salt diet, routine labs ordered  Encounter Diagnoses   Name Primary?     Well adult exam Yes    Hypercholesterolemia     Vitamin D deficiency     Fibromyalgia     Synovial cyst of right popliteal space     Fatty liver disease, nonalcoholic      Orders Placed This Encounter    US ABD COMP    LIPID PANEL    METABOLIC PANEL, COMPREHENSIVE    CBC WITH AUTOMATED DIFF  TSH 3RD GENERATION    VITAMIN D, 25 HYDROXY    VITAMIN B12    glucosam/chond-msm1/C/isabel/bor (GLUCOSAMINE-CHOND-MSM COMPLEX PO)    DISCONTD: diclofenac EC (VOLTAREN) 75 mg EC tablet    gabapentin (NEURONTIN) 300 mg capsule     Labs updated today  Us ordered of abd to eval for fatty liver dx per rheum MD  Refills updated of gabapentin and voltaren for pain  I have discussed the diagnosis with the patient and the intended plan as seen in the above orders. The patient has received an after-visit summary and questions were answered concerning future plans. Patient conveyed understanding of the plan at the time of the visit.     Erick Gong, MSN, ANP  8/26/2019

## 2019-08-27 ENCOUNTER — HOSPITAL ENCOUNTER (OUTPATIENT)
Dept: ULTRASOUND IMAGING | Age: 49
Discharge: HOME | End: 2019-08-27
Payer: MEDICARE

## 2019-08-27 DIAGNOSIS — K76.0 FATTY LIVER DISEASE, NONALCOHOLIC: ICD-10-CM

## 2019-08-27 LAB
25(OH)D3+25(OH)D2 SERPL-MCNC: 33.9 NG/ML (ref 30–100)
ALBUMIN SERPL-MCNC: 4.5 G/DL (ref 3.5–5.5)
ALBUMIN/GLOB SERPL: 1.6 {RATIO} (ref 1.2–2.2)
ALP SERPL-CCNC: 102 IU/L (ref 39–117)
ALT SERPL-CCNC: 21 IU/L (ref 0–32)
AST SERPL-CCNC: 17 IU/L (ref 0–40)
BASOPHILS # BLD AUTO: 0 X10E3/UL (ref 0–0.2)
BASOPHILS NFR BLD AUTO: 1 %
BILIRUB SERPL-MCNC: 0.3 MG/DL (ref 0–1.2)
BUN SERPL-MCNC: 12 MG/DL (ref 6–24)
BUN/CREAT SERPL: 15 (ref 9–23)
CALCIUM SERPL-MCNC: 10 MG/DL (ref 8.7–10.2)
CHLORIDE SERPL-SCNC: 105 MMOL/L (ref 96–106)
CHOLEST SERPL-MCNC: 240 MG/DL (ref 100–199)
CO2 SERPL-SCNC: 19 MMOL/L (ref 20–29)
CREAT SERPL-MCNC: 0.78 MG/DL (ref 0.57–1)
EOSINOPHIL # BLD AUTO: 0.2 X10E3/UL (ref 0–0.4)
EOSINOPHIL NFR BLD AUTO: 2 %
ERYTHROCYTE [DISTWIDTH] IN BLOOD BY AUTOMATED COUNT: 12.9 % (ref 12.3–15.4)
GLOBULIN SER CALC-MCNC: 2.9 G/DL (ref 1.5–4.5)
GLUCOSE SERPL-MCNC: 95 MG/DL (ref 65–99)
HCT VFR BLD AUTO: 44.5 % (ref 34–46.6)
HDLC SERPL-MCNC: 49 MG/DL
HGB BLD-MCNC: 15 G/DL (ref 11.1–15.9)
IMM GRANULOCYTES # BLD AUTO: 0 X10E3/UL (ref 0–0.1)
IMM GRANULOCYTES NFR BLD AUTO: 0 %
INTERPRETATION, 910389: NORMAL
LDLC SERPL CALC-MCNC: 166 MG/DL (ref 0–99)
LYMPHOCYTES # BLD AUTO: 3.1 X10E3/UL (ref 0.7–3.1)
LYMPHOCYTES NFR BLD AUTO: 46 %
MCH RBC QN AUTO: 31.3 PG (ref 26.6–33)
MCHC RBC AUTO-ENTMCNC: 33.7 G/DL (ref 31.5–35.7)
MCV RBC AUTO: 93 FL (ref 79–97)
MONOCYTES # BLD AUTO: 0.4 X10E3/UL (ref 0.1–0.9)
MONOCYTES NFR BLD AUTO: 5 %
NEUTROPHILS # BLD AUTO: 3.1 X10E3/UL (ref 1.4–7)
NEUTROPHILS NFR BLD AUTO: 46 %
PLATELET # BLD AUTO: 265 X10E3/UL (ref 150–450)
POTASSIUM SERPL-SCNC: 4.6 MMOL/L (ref 3.5–5.2)
PROT SERPL-MCNC: 7.4 G/DL (ref 6–8.5)
RBC # BLD AUTO: 4.8 X10E6/UL (ref 3.77–5.28)
SODIUM SERPL-SCNC: 141 MMOL/L (ref 134–144)
TRIGL SERPL-MCNC: 124 MG/DL (ref 0–149)
TSH SERPL DL<=0.005 MIU/L-ACNC: 0.58 UIU/ML (ref 0.45–4.5)
VIT B12 SERPL-MCNC: 629 PG/ML (ref 232–1245)
VLDLC SERPL CALC-MCNC: 25 MG/DL (ref 5–40)
WBC # BLD AUTO: 6.8 X10E3/UL (ref 3.4–10.8)

## 2019-08-27 PROCEDURE — 76700 US EXAM ABDOM COMPLETE: CPT

## 2019-08-27 NOTE — PROGRESS NOTES
Your ultrasound shows mild fatty liver but no other issues. We will recheck in one year to make sure it is stable. It is very important to get the cholesterol down to help this improve as well. It is basically chol build up in the liver.  Ghulam Krishna

## 2019-08-27 NOTE — PROGRESS NOTES
Hey there, the only lab that is darian high is the cholesterol. Did you get the lipitor filled?  Dhruv Muro

## 2019-08-28 ENCOUNTER — TELEPHONE (OUTPATIENT)
Dept: FAMILY MEDICINE CLINIC | Age: 49
End: 2019-08-28

## 2019-08-28 RX ORDER — ATORVASTATIN CALCIUM 40 MG/1
TABLET, FILM COATED ORAL
Qty: 90 TAB | Refills: 3 | Status: SHIPPED | OUTPATIENT
Start: 2019-08-28 | End: 2019-11-26 | Stop reason: SDUPTHER

## 2019-08-28 NOTE — TELEPHONE ENCOUNTER
----- Message from Jose David Courser sent at 8/28/2019 10:32 AM EDT -----  Regarding: Jung/Telephone   General Message/Vendor Calls    Caller's first and last name:      Reason for call:Pt requesting a call back regarding ultrasound results completed on 08/27/19 at [facility].       Callback required yes/no and why:Yes      Best contact number(s):[phone]      Details to clarify the request:      Jose David Courserussell

## 2019-08-28 NOTE — TELEPHONE ENCOUNTER
Spoke with pt id x3 informed pt of labs and US results and that her cholest. Was high.  Asked pt if she had gotten her med filled pt stated she did not as she thought provider wanted to wait to see labs 1st. Pt wants meds sent to [facility]

## 2019-09-24 PROBLEM — R53.83 OTHER FATIGUE: Status: ACTIVE | Noted: 2019-09-24

## 2019-09-25 ENCOUNTER — DOCUMENTATION ONLY (OUTPATIENT)
Dept: FAMILY MEDICINE CLINIC | Age: 49
End: 2019-09-25

## 2019-09-25 NOTE — PROGRESS NOTES
Faxed lab form to [facility] @ [phone]. Confirmation number [id number]. Original form placed in scan folder for central scanning.

## 2019-10-21 ENCOUNTER — TELEPHONE (OUTPATIENT)
Dept: FAMILY MEDICINE CLINIC | Age: 49
End: 2019-10-21

## 2019-10-21 DIAGNOSIS — G47.00 INSOMNIA, UNSPECIFIED TYPE: ICD-10-CM

## 2019-10-21 DIAGNOSIS — F41.1 GAD (GENERALIZED ANXIETY DISORDER): ICD-10-CM

## 2019-10-21 RX ORDER — LORAZEPAM 0.5 MG/1
0.5 TABLET ORAL 2 TIMES DAILY
Qty: 60 TAB | Refills: 0 | OUTPATIENT
Start: 2019-10-21 | End: 2019-11-26 | Stop reason: SDUPTHER

## 2019-10-21 RX ORDER — ZOLPIDEM TARTRATE 12.5 MG/1
TABLET, FILM COATED, EXTENDED RELEASE ORAL
Qty: 30 TAB | Refills: 0 | OUTPATIENT
Start: 2019-10-21 | End: 2019-11-26 | Stop reason: SDUPTHER

## 2019-10-21 NOTE — TELEPHONE ENCOUNTER
----- Message from Jose M Vides sent at 10/21/2019  9:09 AM EDT -----  Regarding: RUSTY Tafoya/telephone  General Message/Vendor Calls    Caller's first and last name: Lito Mccarthy      Reason for call:      Callback required yes/no and why: yes      Best contact number(s): [phone]      Details to clarify the request: Pt stated that she tried to fill her Lorazepam and Ambien and the pharmacy told her that she had to be seen by the office. Pt stated that she was just in in August for a med check.         Jose M Vides

## 2019-10-25 ENCOUNTER — DOCUMENTATION ONLY (OUTPATIENT)
Dept: HEMATOLOGY | Age: 49
End: 2019-10-25

## 2019-10-25 NOTE — PROGRESS NOTES
Bryant More representative called asking for earlier appointment for their pt referred to our practice. She states that the pt is willing to see any provider. Offered her to see Pait Murdock on October 31, she said she just needs to verify the pt first before we finalize to reschedule. Pt rep said she will call back.

## 2019-10-31 ENCOUNTER — OFFICE VISIT (OUTPATIENT)
Dept: HEMATOLOGY | Age: 49
End: 2019-10-31

## 2019-10-31 VITALS
DIASTOLIC BLOOD PRESSURE: 64 MMHG | OXYGEN SATURATION: 96 % | BODY MASS INDEX: 31.28 KG/M2 | SYSTOLIC BLOOD PRESSURE: 109 MMHG | TEMPERATURE: 98.6 F | WEIGHT: 211.8 LBS | HEART RATE: 85 BPM

## 2019-10-31 DIAGNOSIS — K76.0 FATTY LIVER: Primary | ICD-10-CM

## 2019-10-31 RX ORDER — ACETAMINOPHEN/DIPHENHYDRAMINE 500MG-25MG
TABLET ORAL
COMMUNITY
End: 2021-04-01 | Stop reason: ALTCHOICE

## 2019-10-31 RX ORDER — MILK THISTLE 500 MG
1000 CAPSULE ORAL
COMMUNITY
End: 2021-04-01 | Stop reason: ALTCHOICE

## 2019-10-31 NOTE — PROGRESS NOTES
Chief Complaint   Patient presents with    New Patient     Fatty Liver     3 most recent PHQ Screens 10/31/2019   Little interest or pleasure in doing things Nearly every day   Feeling down, depressed, irritable, or hopeless Nearly every day   Total Score PHQ 2 6   Trouble falling or staying asleep, or sleeping too much Nearly every day   Feeling tired or having little energy Nearly every day   Poor appetite, weight loss, or overeating Nearly every day   Feeling bad about yourself - or that you are a failure or have let yourself or your family down Not at all   Trouble concentrating on things such as school, work, reading, or watching TV Not at all   Moving or speaking so slowly that other people could have noticed; or the opposite being so fidgety that others notice Not at all   Thoughts of being better off dead, or hurting yourself in some way Not at all   PHQ 9 Score 15   How difficult have these problems made it for you to do your work, take care of your home and get along with others -       Abuse Screening Questionnaire 10/31/2019   Do you ever feel afraid of your partner? N   Are you in a relationship with someone who physically or mentally threatens you? N   Is it safe for you to go home?  Y     Learning Assessment 10/31/2019   PRIMARY LEARNER Patient   HIGHEST LEVEL OF EDUCATION - PRIMARY LEARNER  -   BARRIERS PRIMARY LEARNER NONE   CO-LEARNER CAREGIVER No   PRIMARY LANGUAGE ENGLISH   LEARNER PREFERENCE PRIMARY DEMONSTRATION   ANSWERED BY patient   RELATIONSHIP SELF

## 2019-10-31 NOTE — LETTER
11/4/19 Patient: Aurea Vargas YOB: 1970 Date of Visit: 10/31/2019 Ajit Stuart NP 
[address] [place] [address] [address] [address] VIA In Basket Dear Ajit Stuart NP, [facility]. My notes for this consultation are attached. If you have questions, please do not hesitate to call me. I look forward to following your patient along with you. Sincerely, Kamryn Cheema NP

## 2019-10-31 NOTE — PROGRESS NOTES
[address] [facility] MD Kym, MD Aline Candelaria PA-C Lindle Horse, [facility]-BC     Corin Munguia, [facility]   Charanjit Briseno, FNP-VAL Francisco, [facility]       Thomas Deputado Juanjo De Lai 136    at [address], [address] [facility], [facility] 22.    [phone]    FAX: [address] [place] [address], [address], [address]    [phone]    FAX: [phone]     Patient Care Team:  Sherwin Huynh NP as PCP - [facility]  Sai Vasquez LPN as Ambulatory Care Manager    Problem List  Date Reviewed: 8/26/2019          Codes Class Noted    Other fatigue ICD-10-CM: R53.83  ICD-9-CM: 780.79  9/24/2019        Vitamin D deficiency ICD-10-CM: E55.9  ICD-9-CM: 268.9  11/10/2015        Essential hypertension ICD-10-CM: I10  ICD-9-CM: 401.9  7/15/2015        DDD (degenerative disc disease), lumbar ICD-10-CM: M51.36  ICD-9-CM: 722.52  10/30/2014        Chronic back pain ICD-10-CM: M54.9, G89.29  ICD-9-CM: 724.5, 338.29  10/30/2014        Abdominal pain, LUQ (left upper quadrant) ICD-10-CM: R10.12  ICD-9-CM: 789.02  1/17/2012        Nausea & vomiting ICD-10-CM: R11.2  ICD-9-CM: 787.01  1/17/2012        Sciatica ICD-10-CM: M54.30  ICD-9-CM: 724.3  1/7/2011        Hypercholesterolemia ICD-10-CM: E78.00  ICD-9-CM: 272.0  1/7/2011            The clinicians listed above have asked me to see Liza Lea in consultation regarding suspected fatty liver disease and its management. All medical records sent by the referring physicians were reviewed including imaging studies. The patient is a 51 y.o.  female who is suspected to have fatty liver disease based upon ultrasound.       The most recent laboratory studies indicate the liver transaminases are normal, alkaline phosphatase is normal, tests of hepatic synthetic and metabolic function are normal, total bilirubin is normal, albumin is normal and the platelet count is normal.      Serologic evaluation for markers of chronic liver disease has either not been performed or the results are not available. The most recent imaging of the liver was an ultrasound performed in 8/2019. Results suggest fatty liver disease. An assessment of liver fibrosis with biopsy or elastography has not been performed. The patient has no symptoms which can be attributed to the liver disorder. She has the following complaints but I do not believe they are related to her liver disease: burning in her entire GI tract, diarrhea x 10 years, inflammation in her GI tract, blisters on the inside of her tongue (which she believes is from her anemia), red spots on her legs. The patient has not experienced the following symptoms: yellowing of the eyes or skin or swelling of the lower extremities. The patient has moderate limitations in functional activities which can be attributed to other medical problems not related to the liver disease. She is concerned the inflammation in her GI tract has spread to her liver. She is also concerned she may have a virus due to being stung by numerous mosquitoes on a vacation with her son. She has been taking alkaline supported silver solution for this. She has been on this since about 10/26/2019. She believes a lot of symptoms stem from a 2008 hysterectomy with bladder tack during which she received a transfusion. ASSESSMENT AND PLAN:  Suspected fatty liver based upon imaging. The histologic severity has not been defined. NAFL is a benign form of fatty liver disease and not thought to progress to fibrosis or cirrhosis.       Liver transaminases are normal. ALP is normal. Liver function is normal. Total bilirubin is normal. Serum albumin is normal. The platelet count is   normal.      Based upon laboratory studies and imaging, the patient does not appear to have advanced liver disease. Will perform laboratory testing to monitor liver function and degree  of liver injury. This included BMP, hepatic panel, CBC with platelet count and INR. Serologic testing for causes of chronic liver disease were ordered. I am checking for diseases which may cause inflammation in the liver without enzyme elevation. Counseling for diet and weight loss in patients with confirmed or suspected NAFLD  The patient was counseled regarding diet and exercise to achieve weight loss. The best diet for patients with fatty liver is one very low in carbohydrates and enriched with protein such as an Barbara's program.      The patient states she is allergic to many foods now as partially undigested food has leaked out into her bloodstream. She eats very little carbs and states the majority of her weight is inflammation. Currently, there are no approved medications for MELGAR. Weight loss is the only treatment. The patient was told not to consume any food products and drinks containing fructose as this enhances hepatic fat synthesis. There is no medication or vitamin supplements we advocate for MELGAR. Screening for hepatocellular carcinoma  HCC screening is not necessary if the patient has no evidence of cirrhosis. Treatment of other medical problems in patients with chronic liver disease  There are no contraindications for the patient to take most medications necessary for treatment of other medical issues. The patient can take any medications utilized for treatment of DM and/or statins to treat hypercholesterolemia. The patient does not consume alcohol on a daily basis. Normal doses of acetaminophen, as recommended on the label of the bottle, are not hepatotoxic except in the setting of daily alcohol use.  Even patients with cirrhosis can use acetaminophen for pain. Counseling for alcohol in patients with chronic liver disease  The patient was counseled regarding alcohol consumption and the effect of alcohol on chronic liver disease. The patient does not consume any significant amount of alcohol. Vaccinations   The need for vaccination against viral hepatitis A and B will be assessed with serologic and instituted as appropriate. Routine vaccinations against other bacterial and viral agents can be performed as indicated. Annual flu vaccination should be administered if indicated. ALLERGIES  Allergies   Allergen Reactions    Beef Containing Products Anaphylaxis    Percocet [Oxycodone-Acetaminophen] Itching     Pt states not allergic to medication    Codeine Nausea and Vomiting    Nuts [Tree Nut] Unknown (comments)     Allergy tested.  Pork Derived (Porcine) Unknown (comments)     Allergy tested     MEDICATIONS  Current Outpatient Medications   Medication Sig    glucosamine HCl/chondroitin guevara (GLUCOSAMINE-CHONDROITIN) 2,000-1,200 mg/30 mL liqd Take  by mouth.  Bacillus coagulans/inulin (PROBIOTIC WITH PREBIOTIC PO) Take  by mouth.  milk thistle 500 mg cap Take 1,000 mg by mouth.  OTHER Indications: Life tones  for uric acid support 3.38 fl oz    OTHER Indications: silver solution supplements for cleansing bacteria    vit B Cmplx 3-FA-Vit C-Biotin (NEPHRO SYDNI RX) 1- mg-mg-mcg tablet Take 1 Tab by mouth daily.  cannabidiol, CBD, extract 100 mg/mL soln Take  by mouth.  OTHER Indications: Moringa oleifera    LORazepam (ATIVAN) 0.5 mg tablet Take 1 Tab by mouth two (2) times a day. - must last 30 days    zolpidem CR (AMBIEN CR) 12.5 mg tablet TAKE ONE TABLET BY MOUTH EVERY NIGHT AT BEDTIME AS NEEDED FOR SLEEP - must last 30 days    atorvastatin (LIPITOR) 40 mg tablet TAKE 1 TABLET BY MOUTH EVERY DAY    glucosam/chond-msm1/C/isabel/bor (GLUCOSAMINE-CHOND-MSM COMPLEX PO) Take  by mouth.     diclofenac EC (VOLTAREN) 75 mg EC tablet TAKE 1 TABLET BY MOUTH TWICE DAILY AS NEEDED FOR JOINT PAIN    DULoxetine (CYMBALTA) 60 mg capsule TAKE ONE CAPSULE BY MOUTH EVERY DAY    multivitamin (ONE A DAY) tablet Take 1 Tab by mouth daily.  ergocalciferol (VITAMIN D2) 50,000 unit capsule TAKE ONE CAPSULE BY MOUTH EVERY 7 DAYS.  VENTOLIN HFA 90 mcg/actuation inhaler INHALE 1 PUFF BY MOUTH EVERY 4 HOURS AS NEEDED    EPINEPHrine (EPIPEN) 0.3 mg/0.3 mL injection INJECT 1 PEN INTO THE MUSCLE ONCE AS DIRECTED AS NEEDED    gabapentin (NEURONTIN) 300 mg capsule Take 1 Cap by mouth two (2) times a day. Max Daily Amount: 600 mg. TAKE ONE CAPSULE BY MOUTH THREE TIMES DAILY     No current facility-administered medications for this visit. SYSTEM REVIEW NOT RELATED TO LIVER DISEASE OR REVIEWED ABOVE:  Constitution systems: Negative for fever, chills, weight gain, weight loss. Eyes: Positive for blurred vision. ENT: Negative for sore throat, painful swallowing. Respiratory: Negative for cough, hemoptysis, SOB. Cardiology: Negative for chest pain, palpitations. GI:  Positive for diarrhea/oily stool. Bloating. Negative for constipation. : Negative for urinary frequency, dysuria, hematuria, nocturia. Skin: Positive for rash. Hematology: Negative for easy bruising, blood clots. Musculo-skeletal: Negative for back pain, muscle pain, weakness. Neurologic: Positive for memory issues. Negative for headaches, dizziness, vertigo, not related to HE. Psychology: Negative for anxiety, depression. FAMILY HISTORY:  The father has heart disease and stomach issues. The mother has . She had \"breathing issues, gallbladder and liver issues. \"  There is no family history of liver diseases. The is family history of the following immune disorder: rheumatoid arthritis. SOCIAL HISTORY:  The patient is  twice. The patient has 3son, 22years old. The patient smokes 1/2 PPD.    The patient has never consumed significant amounts of alcohol. The patient has been abstinent from alcohol since 2017. The patient is currently receiving disability and has been since diagnosis of breast cancer in 2003. PHYSICAL EXAMINATION:  VS: per nursing note  General: No acute distress. Obese. Eyes: Sclera anicteric. ENT: No oral lesions. Nodes: No adenopathy. Skin: No spider angiomata. No jaundice. No palmar erythema. Respiratory: Lungs clear to auscultation. Cardiovascular: Regular heart rate. No murmurs. No JVD. Abdomen: Soft non-tender, liver size normal to percussion/palpation. Spleen not palpable. No obvious ascites. Extremities: No edema. No muscle wasting. No gross arthritic changes. Red blanchable [place] on lateral left foot, smaller red circles on right calf and left leg. Neurologic: Alert and oriented. Cranial nerves grossly intact. No asterixis.     LABORATORY STUDIES:  [facility] [id number] [place] [address] St Units 8/26/2019 10/14/2016   WBC 3.4 - 10.8 x10E3/uL 6.8 6.6   ANC 1.4 - 7.0 x10E3/uL 3.1 2.9   HGB 11.1 - 15.9 g/dL 15.0 15.4    - 450 x10E3/uL 265 257   AST 0 - 40 IU/L 17 16   ALT 0 - 32 IU/L 21 16   Alk Phos 39 - 117 IU/L 102 109   Bili, Total 0.0 - 1.2 mg/dL 0.3 0.6   Albumin 3.5 - 5.5 g/dL 4.5 4.3   BUN 6 - 24 mg/dL 12 7   Creat 0.57 - 1.00 mg/dL 0.78 0.79   Na 134 - 144 mmol/L 141 140   K 3.5 - 5.2 mmol/L 4.6 4.1   Cl 96 - 106 mmol/L 105 100   CO2 20 - 29 mmol/L 19 (L) 22   Glucose 65 - 99 mg/dL 95 102 (H)   Magnesium 1.6 - 2.3 mg/dL  2.0     [address] Ref Rng & Units 5/20/2016   WBC 3.4 - 10.8 x10E3/uL 8.4   ANC 1.4 - 7.0 x10E3/uL 4.4   HGB 11.1 - 15.9 g/dL 14.0    - 450 x10E3/uL 218   AST 0 - 40 IU/L    ALT 0 - 32 IU/L    Alk Phos 39 - 117 IU/L    Bili, Total 0.0 - 1.2 mg/dL    Albumin 3.5 - 5.5 g/dL    BUN 6 - 24 mg/dL 6   Creat 0.57 - 1.00 mg/dL 0.87   Na 134 - 144 mmol/L 141   K 3.5 - 5.2 mmol/L 3.6   Cl 96 - 106 mmol/L 105   CO2 20 - 29 mmol/L 28 Glucose 65 - 99 mg/dL 125 (H)   Magnesium 1.6 - 2.3 mg/dL      SEROLOGIES:  Not available or performed. Testing was performed today. LIVER HISTOLOGY:  Not available or performed    ENDOSCOPIC PROCEDURES:  Not available or performed    RADIOLOGY:  8/2019. Abdominal ultrasound. Mild increased hepatic echogenicity. No masses. S/p edwin. OTHER TESTING:  Not available or performed    FOLLOW-UP:  All of the issues listed above in the assessment and plan were discussed with the patient. All questions were answered. The patient expressed a clear understanding of the above. Follow-up Jude Zengiro 32 in PRN. As all of her symptoms are not liver-related, I think it best to focus on symptom management with GI. I will call her when I get her results back.      [place], EDIE-BC  Liver [place] [facility] [address] [facility], [address] [address]  [phone]

## 2019-11-01 LAB
ALBUMIN SERPL-MCNC: 4.3 G/DL (ref 3.5–5.5)
ALP SERPL-CCNC: 100 IU/L (ref 39–117)
ALT SERPL-CCNC: 14 IU/L (ref 0–32)
ANA SER QL: NEGATIVE
AST SERPL-CCNC: 16 IU/L (ref 0–40)
BILIRUB DIRECT SERPL-MCNC: 0.1 MG/DL (ref 0–0.4)
BILIRUB SERPL-MCNC: 0.4 MG/DL (ref 0–1.2)
BUN SERPL-MCNC: 10 MG/DL (ref 6–24)
BUN/CREAT SERPL: 13 (ref 9–23)
CALCIUM SERPL-MCNC: 9.7 MG/DL (ref 8.7–10.2)
CHLORIDE SERPL-SCNC: 107 MMOL/L (ref 96–106)
CO2 SERPL-SCNC: 23 MMOL/L (ref 20–29)
CREAT SERPL-MCNC: 0.78 MG/DL (ref 0.57–1)
ERYTHROCYTE [DISTWIDTH] IN BLOOD BY AUTOMATED COUNT: 11.7 % (ref 12.3–15.4)
FERRITIN SERPL-MCNC: 45 NG/ML (ref 15–150)
GLUCOSE SERPL-MCNC: 95 MG/DL (ref 65–99)
HCT VFR BLD AUTO: 45.1 % (ref 34–46.6)
HGB BLD-MCNC: 15.2 G/DL (ref 11.1–15.9)
INR PPP: 1 (ref 0.8–1.2)
IRON SATN MFR SERPL: 16 % (ref 15–55)
IRON SERPL-MCNC: 57 UG/DL (ref 27–159)
MCH RBC QN AUTO: 31.1 PG (ref 26.6–33)
MCHC RBC AUTO-ENTMCNC: 33.7 G/DL (ref 31.5–35.7)
MCV RBC AUTO: 92 FL (ref 79–97)
PLATELET # BLD AUTO: 261 X10E3/UL (ref 150–450)
POTASSIUM SERPL-SCNC: 4.1 MMOL/L (ref 3.5–5.2)
PROT SERPL-MCNC: 6.9 G/DL (ref 6–8.5)
PROTHROMBIN TIME: 10.4 SEC (ref 9.1–12)
RBC # BLD AUTO: 4.88 X10E6/UL (ref 3.77–5.28)
SODIUM SERPL-SCNC: 145 MMOL/L (ref 134–144)
TIBC SERPL-MCNC: 360 UG/DL (ref 250–450)
UIBC SERPL-MCNC: 303 UG/DL (ref 131–425)
WBC # BLD AUTO: 8.3 X10E3/UL (ref 3.4–10.8)

## 2019-11-01 RX ORDER — EPINEPHRINE 0.3 MG/.3ML
INJECTION SUBCUTANEOUS
Qty: 0.6 ML | Refills: 1 | Status: SHIPPED | OUTPATIENT
Start: 2019-11-01 | End: 2021-04-11

## 2019-11-02 LAB — MITOCHONDRIA M2 IGG SER-ACNC: 43.7 UNITS (ref 0–20)

## 2019-11-11 LAB
LAB DIRECTOR NAME PROVIDER: NORMAL
SERPINA1 GENE MUT ANL BLD/T: NORMAL
SERPINA1 GENE MUT TESTED BLD/T: NORMAL

## 2019-11-26 ENCOUNTER — OFFICE VISIT (OUTPATIENT)
Dept: FAMILY MEDICINE CLINIC | Age: 49
End: 2019-11-26

## 2019-11-26 VITALS
TEMPERATURE: 98 F | OXYGEN SATURATION: 96 % | HEART RATE: 73 BPM | DIASTOLIC BLOOD PRESSURE: 71 MMHG | WEIGHT: 204 LBS | BODY MASS INDEX: 30.21 KG/M2 | SYSTOLIC BLOOD PRESSURE: 114 MMHG | HEIGHT: 69 IN | RESPIRATION RATE: 14 BRPM

## 2019-11-26 DIAGNOSIS — F32.A DEPRESSION, UNSPECIFIED DEPRESSION TYPE: Primary | ICD-10-CM

## 2019-11-26 DIAGNOSIS — E55.9 VITAMIN D DEFICIENCY: ICD-10-CM

## 2019-11-26 DIAGNOSIS — G47.00 INSOMNIA, UNSPECIFIED TYPE: ICD-10-CM

## 2019-11-26 DIAGNOSIS — E78.00 HYPERCHOLESTEROLEMIA: ICD-10-CM

## 2019-11-26 DIAGNOSIS — F41.1 GAD (GENERALIZED ANXIETY DISORDER): ICD-10-CM

## 2019-11-26 RX ORDER — LORAZEPAM 0.5 MG/1
0.5 TABLET ORAL 2 TIMES DAILY
Qty: 60 TAB | Refills: 2 | Status: SHIPPED | OUTPATIENT
Start: 2019-11-26 | End: 2020-06-18 | Stop reason: SDUPTHER

## 2019-11-26 RX ORDER — ATORVASTATIN CALCIUM 40 MG/1
TABLET, FILM COATED ORAL
Qty: 90 TAB | Refills: 3 | Status: SHIPPED | OUTPATIENT
Start: 2019-11-26 | End: 2020-10-28

## 2019-11-26 RX ORDER — DULOXETIN HYDROCHLORIDE 60 MG/1
CAPSULE, DELAYED RELEASE ORAL
Qty: 90 CAP | Refills: 3 | Status: SHIPPED | OUTPATIENT
Start: 2019-11-26 | End: 2020-06-18 | Stop reason: SDUPTHER

## 2019-11-26 RX ORDER — ERGOCALCIFEROL 1.25 MG/1
CAPSULE ORAL
Qty: 12 CAP | Refills: 3 | Status: SHIPPED | OUTPATIENT
Start: 2019-11-26 | End: 2020-11-02

## 2019-11-26 RX ORDER — ZOLPIDEM TARTRATE 12.5 MG/1
TABLET, FILM COATED, EXTENDED RELEASE ORAL
Qty: 30 TAB | Refills: 2 | Status: SHIPPED | OUTPATIENT
Start: 2019-11-26 | End: 2020-06-18 | Stop reason: SDUPTHER

## 2019-11-26 NOTE — PROGRESS NOTES
HISTORY OF PRESENT ILLNESS  Rachel Singh is a 51 y.o. female. HPI  Patient is here today for med update for anxiety/depression  Was feeling good emotionally and stopped the cymbalta  Mood has gotten much worse and needs to get med refilled  Continues to use ambien at night and ativan prn bid  Wants to see psychiatry, believes she has multiple personality disorder    Needs to mireya refill of her chol and vit D as well  Has been inconsistently taking meds    ROS  A comprehensive review of system was obtained and negative except findings in the HPI    Visit Vitals  /71 (BP 1 Location: Left arm, BP Patient Position: Sitting)   Pulse 73   Temp 98 °F (36.7 °C) (Oral)   Resp 14   Ht 5' 9\" (1.753 m)   Wt 204 lb (92.5 kg)   SpO2 96%   BMI 30.13 kg/m²     Physical Exam  Vitals signs and nursing note reviewed. Constitutional:       Appearance: She is well-developed. Comments:      Neck:      Vascular: No JVD. Cardiovascular:      Rate and Rhythm: Normal rate and regular rhythm. Heart sounds: No murmur. No friction rub. No gallop. Pulmonary:      Effort: Pulmonary effort is normal. No respiratory distress. Breath sounds: Normal breath sounds. No wheezing. Skin:     General: Skin is warm. Neurological:      Mental Status: She is alert and oriented to person, place, and time. ASSESSMENT and PLAN  Encounter Diagnoses   Name Primary?     Depression, unspecified depression type Yes    PAVEL (generalized anxiety disorder)     Insomnia, unspecified type     Vitamin D deficiency     Hypercholesterolemia      Orders Placed This Encounter    REFERRAL TO PSYCHIATRY    DULoxetine (CYMBALTA) 60 mg capsule    LORazepam (ATIVAN) 0.5 mg tablet    zolpidem CR (AMBIEN CR) 12.5 mg tablet    atorvastatin (LIPITOR) 40 mg tablet    ergocalciferol (VITAMIN D2) 50,000 unit capsule     Refills updated today   Follow up February for labs and med check  I have discussed the diagnosis with the patient and the intended plan as seen in the above orders. The patient has received an after-visit summary and questions were answered concerning future plans. Patient conveyed understanding of the plan at the time of the visit.     Nia Mascorro MSN, ANP  11/26/2019

## 2019-11-26 NOTE — PROGRESS NOTES
Chief Complaint   Patient presents with    Medication Refill     Pt in office today for med refill    1. Have you been to the ER, urgent care clinic since your last visit? Hospitalized since your last visit? No    2. Have you seen or consulted any other health care providers outside of the [address] [place] [address] since your last visit? Include any pap smears or colon screening.  Yes When: gastro     Pt has no other concerns

## 2020-03-28 DIAGNOSIS — M79.7 FIBROMYALGIA: ICD-10-CM

## 2020-03-30 RX ORDER — DICLOFENAC SODIUM 75 MG/1
TABLET, DELAYED RELEASE ORAL
Qty: 60 TAB | Refills: 5 | Status: SHIPPED | OUTPATIENT
Start: 2020-03-30 | End: 2021-04-11

## 2020-05-11 NOTE — PROGRESS NOTES
Pt states that she has been through this and states that she cant take the trazodone as it leaves her groggy. She states with a PA the ambien 12.5 has been covered before.  I verbalized understanding

## 2020-05-11 NOTE — PROGRESS NOTES
Please let her know that her insurance will only cover trazodone for sleep. Will send that to her pharm if she wants to try this.  Lyndsey Torres

## 2020-06-18 ENCOUNTER — TELEPHONE (OUTPATIENT)
Dept: FAMILY MEDICINE CLINIC | Age: 50
End: 2020-06-18

## 2020-06-18 ENCOUNTER — VIRTUAL VISIT (OUTPATIENT)
Dept: FAMILY MEDICINE CLINIC | Age: 50
End: 2020-06-18

## 2020-06-18 DIAGNOSIS — G47.00 INSOMNIA, UNSPECIFIED TYPE: ICD-10-CM

## 2020-06-18 DIAGNOSIS — F41.1 GAD (GENERALIZED ANXIETY DISORDER): ICD-10-CM

## 2020-06-18 DIAGNOSIS — R42 VERTIGO: Primary | ICD-10-CM

## 2020-06-18 RX ORDER — DULOXETIN HYDROCHLORIDE 60 MG/1
CAPSULE, DELAYED RELEASE ORAL
Qty: 90 CAP | Refills: 3 | Status: SHIPPED | OUTPATIENT
Start: 2020-06-18 | End: 2021-07-19

## 2020-06-18 RX ORDER — PANTOPRAZOLE SODIUM 40 MG/1
40 TABLET, DELAYED RELEASE ORAL DAILY
Qty: 90 TAB | Refills: 3 | Status: SHIPPED | OUTPATIENT
Start: 2020-06-18 | End: 2021-07-19

## 2020-06-18 RX ORDER — ZOLPIDEM TARTRATE 12.5 MG/1
TABLET, FILM COATED, EXTENDED RELEASE ORAL
Qty: 30 TAB | Refills: 2 | Status: SHIPPED | OUTPATIENT
Start: 2020-06-18 | End: 2021-04-11

## 2020-06-18 RX ORDER — LORAZEPAM 0.5 MG/1
0.5 TABLET ORAL 2 TIMES DAILY
Qty: 60 TAB | Refills: 2 | Status: SHIPPED | OUTPATIENT
Start: 2020-06-18 | End: 2021-04-11

## 2020-06-18 NOTE — PROGRESS NOTES
Andriy Gregg is a 53 y.o. female who was seen by synchronous (real-time) audio-video technology on 6/18/2020. Consent: Andriy Gregg, who was seen by synchronous (real-time) audio-video technology, and/or her healthcare decision maker, is aware that this patient-initiated, Telehealth encounter on 6/18/2020 is a billable service, with coverage as determined by her insurance carrier. She is aware that she may receive a bill and has provided verbal consent to proceed: Yes. I spent at least 15 minutes on this visit with this established patient. 712  Subjective:   Andriy Gregg is a 53 y.o. female who was seen for Medication Refill  she is due for refill of her meds for 3 mo for anxiety and sleep  No adr/se of med noted    Prior to Admission medications    Medication Sig Start Date End Date Taking? Authorizing Provider   DULoxetine (CYMBALTA) 60 mg capsule TAKE ONE CAPSULE BY MOUTH EVERY DAY 6/18/20  Yes Sharan Mora NP   LORazepam (Ativan) 0.5 mg tablet Take 1 Tab by mouth two (2) times a day. - must last 30 days 6/18/20  Yes Sharan Mora NP   zolpidem CR (AMBIEN CR) 12.5 mg tablet TAKE ONE TABLET BY MOUTH EVERY NIGHT AT BEDTIME AS NEEDED FOR SLEEP - must last 30 days 6/18/20  Yes Sharan Mora NP   pantoprazole (PROTONIX) 40 mg tablet Take 1 Tab by mouth daily. 6/18/20  Yes Sharan Mora NP   diclofenac EC (VOLTAREN) 75 mg EC tablet TAKE 1 TABLET BY MOUTH TWICE DAILY AS NEEDED FOR JOINT PAIN 3/30/20   Sharan Mora NP   atorvastatin (LIPITOR) 40 mg tablet TAKE 1 TABLET BY MOUTH EVERY DAY 11/26/19   Sharan Mora NP   ergocalciferol (VITAMIN D2) 50,000 unit capsule TAKE ONE CAPSULE BY MOUTH EVERY 7 DAYS. 11/26/19   Sharan Mora NP   DULoxetine (CYMBALTA) 60 mg capsule TAKE ONE CAPSULE BY MOUTH EVERY DAY 11/26/19 6/18/20  Sharan Mora NP   LORazepam (ATIVAN) 0.5 mg tablet Take 1 Tab by mouth two (2) times a day.  - must last 30 days 11/26/19 6/18/20 Ana Yang NP   zolpidem CR (AMBIEN CR) 12.5 mg tablet TAKE ONE TABLET BY MOUTH EVERY NIGHT AT BEDTIME AS NEEDED FOR SLEEP - must last 30 days 11/26/19 6/18/20  Ana Yang NP   EPINEPHrine (EPIPEN) 0.3 mg/0.3 mL injection INJECT 1 PEN INTO THE MUSCLE ONCE AS DIRECTED AS NEEDED 11/1/19   Ana Yang NP   glucosamine HCl/chondroitin guevara (GLUCOSAMINE-CHONDROITIN) 2,000-1,200 mg/30 mL liqd Take  by mouth. Provider, Historical   Bacillus coagulans/inulin (PROBIOTIC WITH PREBIOTIC PO) Take  by mouth. Provider, Historical   milk thistle 500 mg cap Take 1,000 mg by mouth. Provider, Historical   OTHER Indications: Life tones  for uric acid support 3.38 fl oz    Provider, Historical   OTHER Indications: silver solution supplements for cleansing bacteria    Provider, Historical   vit B Cmplx 3-FA-Vit C-Biotin (NEPHRO SYDNI RX) 1- mg-mg-mcg tablet Take 1 Tab by mouth daily. Provider, Historical   cannabidiol, CBD, extract 100 mg/mL soln Take  by mouth. Provider, Historical   OTHER Indications: Moringa oleifera    Provider, Historical   glucosam/chond-msm1/C/isabel/bor (GLUCOSAMINE-CHOND-MSM COMPLEX PO) Take  by mouth. Provider, Historical   gabapentin (NEURONTIN) 300 mg capsule Take 1 Cap by mouth two (2) times a day. Max Daily Amount: 600 mg. TAKE ONE CAPSULE BY MOUTH THREE TIMES DAILY 8/26/19   Ana Yang NP   multivitamin (ONE A DAY) tablet Take 1 Tab by mouth daily. Provider, Historical   VENTOLIN HFA 90 mcg/actuation inhaler INHALE 1 PUFF BY MOUTH EVERY 4 HOURS AS NEEDED 3/1/18   Ana Yang NP     Allergies   Allergen Reactions    Beef Containing Products Anaphylaxis    Percocet [Oxycodone-Acetaminophen] Itching     Pt states not allergic to medication    Codeine Nausea and Vomiting    Flagyl [Metronidazole] Other (comments)     c-diff    Nuts [Tree Nut] Unknown (comments)     Allergy tested.     Pork Derived (Porcine) Unknown (comments)     Allergy tested       Patient Active Problem List    Diagnosis Date Noted    Other fatigue 09/24/2019    Vitamin D deficiency 11/10/2015    Essential hypertension 07/15/2015    DDD (degenerative disc disease), lumbar 10/30/2014    Chronic back pain 10/30/2014    Abdominal pain, LUQ (left upper quadrant) 01/17/2012    Nausea & vomiting 01/17/2012    Sciatica 01/07/2011    Hypercholesterolemia 01/07/2011       ROS  A comprehensive review of system was obtained and negative except findings in the HPI      Objective: There were no vitals taken for this visit. General: alert, cooperative, no distress   Mental  status: normal mood, behavior, speech, dress, motor activity, and thought processes, able to follow commands   HENT: NCAT   Neck: no visualized mass   Resp: no respiratory distress   Neuro: no gross deficits   Skin: no discoloration or lesions of concern on visible areas   Psychiatric: normal affect, consistent with stated mood, no evidence of hallucinations     Additional exam findings: none    Diagnoses and all orders for this visit:    1. PAVEL (generalized anxiety disorder)  -     LORazepam (Ativan) 0.5 mg tablet; Take 1 Tab by mouth two (2) times a day. - must last 30 days    2. Insomnia, unspecified type  -     zolpidem CR (AMBIEN CR) 12.5 mg tablet; TAKE ONE TABLET BY MOUTH EVERY NIGHT AT BEDTIME AS NEEDED FOR SLEEP - must last 30 days    Other orders  -     DULoxetine (CYMBALTA) 60 mg capsule; TAKE ONE CAPSULE BY MOUTH EVERY DAY  -     pantoprazole (PROTONIX) 40 mg tablet; Take 1 Tab by mouth daily. Refills updated today  Follow up 3 mo      We discussed the expected course, resolution and complications of the diagnosis(es) in detail. Medication risks, benefits, costs, interactions, and alternatives were discussed as indicated. I advised her to contact the office if her condition worsens, changes or fails to improve as anticipated. She expressed understanding with the diagnosis(es) and plan. Valeri Deluna is a 53 y.o. female who was evaluated by a video visit encounter for concerns as above. Patient identification was verified prior to start of the visit. A caregiver was present when appropriate. Due to this being a TeleHealth encounter (During VJQSE-20 public health emergency), evaluation of the following organ systems was limited: Vitals/Constitutional/EENT/Resp/CV/GI//MS/Neuro/Skin/Heme-Lymph-Imm. Pursuant to the emergency declaration under the [address] [place] [address], [facility] waiver authority and the "[employer] and [employer] Act, this Virtual  Visit was conducted, with patient's (and/or legal guardian's) consent, to reduce the patient's risk of exposure to COVID-19 and provide necessary medical care. Services were provided through a video synchronous discussion virtually to substitute for in-person clinic visit. Patient and provider were located at their individual homes.       Davidson Jay NP

## 2020-06-18 NOTE — TELEPHONE ENCOUNTER
Please place a referral for dr Julián Baires for vertigo and ringing in her ears. Fax to  [phone] Insurance only requires dr to dr john

## 2020-10-28 RX ORDER — ATORVASTATIN CALCIUM 40 MG/1
TABLET, FILM COATED ORAL
Qty: 90 TAB | Refills: 3 | Status: SHIPPED | OUTPATIENT
Start: 2020-10-28 | End: 2021-12-07 | Stop reason: SDUPTHER

## 2020-11-02 RX ORDER — ERGOCALCIFEROL 1.25 MG/1
CAPSULE ORAL
Qty: 12 CAP | Refills: 3 | Status: SHIPPED | OUTPATIENT
Start: 2020-11-02 | End: 2022-06-22 | Stop reason: ALTCHOICE

## 2021-04-01 ENCOUNTER — OFFICE VISIT (OUTPATIENT)
Dept: FAMILY MEDICINE CLINIC | Age: 51
End: 2021-04-01
Payer: MEDICARE

## 2021-04-01 VITALS
HEIGHT: 69 IN | WEIGHT: 190 LBS | BODY MASS INDEX: 28.14 KG/M2 | TEMPERATURE: 97.9 F | SYSTOLIC BLOOD PRESSURE: 125 MMHG | DIASTOLIC BLOOD PRESSURE: 72 MMHG | HEART RATE: 77 BPM | OXYGEN SATURATION: 97 % | RESPIRATION RATE: 18 BRPM

## 2021-04-01 DIAGNOSIS — R41.3 MEMORY LOSS: ICD-10-CM

## 2021-04-01 DIAGNOSIS — M51.36 DDD (DEGENERATIVE DISC DISEASE), LUMBAR: ICD-10-CM

## 2021-04-01 DIAGNOSIS — F41.1 GAD (GENERALIZED ANXIETY DISORDER): Primary | ICD-10-CM

## 2021-04-01 PROCEDURE — G8752 SYS BP LESS 140: HCPCS | Performed by: NURSE PRACTITIONER

## 2021-04-01 PROCEDURE — G8419 CALC BMI OUT NRM PARAM NOF/U: HCPCS | Performed by: NURSE PRACTITIONER

## 2021-04-01 PROCEDURE — 99213 OFFICE O/P EST LOW 20 MIN: CPT | Performed by: NURSE PRACTITIONER

## 2021-04-01 PROCEDURE — G8432 DEP SCR NOT DOC, RNG: HCPCS | Performed by: NURSE PRACTITIONER

## 2021-04-01 PROCEDURE — G8754 DIAS BP LESS 90: HCPCS | Performed by: NURSE PRACTITIONER

## 2021-04-01 PROCEDURE — G8427 DOCREV CUR MEDS BY ELIG CLIN: HCPCS | Performed by: NURSE PRACTITIONER

## 2021-04-01 RX ORDER — PREGABALIN 50 MG/1
50 CAPSULE ORAL 2 TIMES DAILY
Qty: 60 CAP | Refills: 1 | Status: SHIPPED | OUTPATIENT
Start: 2021-04-01 | End: 2022-06-22 | Stop reason: ALTCHOICE

## 2021-04-01 NOTE — PROGRESS NOTES
Chief Complaint   Patient presents with   Francisco Brown Annual Wellness Visit     Patient is present in office today for Medicare annual wellness visit. Pt needs EpiPen and other refills. Pt expresses concerns with memory issues, focusing, energy level is low. Pt states she is having episodes of having a lot of energy than crashing with no energy   States that some days for days at a time she does not want to do anything. 1. Have you been to the ER, urgent care clinic since your last visit? Hospitalized since your last visit? No    2. Have you seen or consulted any other health care providers outside of the [address] [place] [address] [place] since your last visit? Include any pap smears or colon screening.  No

## 2021-04-06 NOTE — PROGRESS NOTES
HISTORY OF PRESENT ILLNESS  Citlaly Chapa is a 53 y.o. female. HPI  Pt expresses concerns with memory issues, focusing, energy level is low. Pt states she is having episodes of having a lot of energy than crashing with no energy   States that some days for days at a time she does not want to do anything. ROS  A comprehensive review of system was obtained and negative except findings in the HPI    Visit Vitals  /72 (BP 1 Location: Left arm, BP Patient Position: Sitting, BP Cuff Size: Adult)   Pulse 77   Temp 97.9 °F (36.6 °C) (Oral)   Resp 18   Ht 5' 9\" (1.753 m)   Wt 190 lb (86.2 kg)   SpO2 97%   BMI 28.06 kg/m²     Physical Exam  Vitals signs and nursing note reviewed. Constitutional:       Appearance: She is well-developed. Comments:      Neck:      Vascular: No JVD. Cardiovascular:      Rate and Rhythm: Normal rate and regular rhythm. Heart sounds: No murmur. No friction rub. No gallop. Pulmonary:      Effort: Pulmonary effort is normal. No respiratory distress. Breath sounds: Normal breath sounds. No wheezing. Skin:     General: Skin is warm. Neurological:      Mental Status: She is alert and oriented to person, place, and time. ASSESSMENT and PLAN  Encounter Diagnoses   Name Primary?  PAVEL (generalized anxiety disorder) Yes    Memory loss     DDD (degenerative disc disease), lumbar      Orders Placed This Encounter    pregabalin (LYRICA) 50 mg capsule     Will make an appt for fasting labs and well exam  Start Lyrica 50mg bid and see if helps with pain  I have discussed the diagnosis with the patient and the intended plan as seen in the above orders. The patient has received an after-visit summary and questions were answered concerning future plans. Patient conveyed understanding of the plan at the time of the visit.     Max Hoover, MSN, ANP  4/5/2021

## 2021-04-09 DIAGNOSIS — M79.7 FIBROMYALGIA: ICD-10-CM

## 2021-04-11 RX ORDER — DICLOFENAC SODIUM 75 MG/1
TABLET, DELAYED RELEASE ORAL
Qty: 60 TAB | Refills: 5 | Status: SHIPPED | OUTPATIENT
Start: 2021-04-11 | End: 2022-06-22 | Stop reason: ALTCHOICE

## 2021-04-14 DIAGNOSIS — G47.00 INSOMNIA, UNSPECIFIED TYPE: Primary | ICD-10-CM

## 2021-04-22 ENCOUNTER — OFFICE VISIT (OUTPATIENT)
Dept: FAMILY MEDICINE CLINIC | Age: 51
End: 2021-04-22
Payer: MEDICARE

## 2021-04-22 VITALS
HEART RATE: 72 BPM | DIASTOLIC BLOOD PRESSURE: 72 MMHG | SYSTOLIC BLOOD PRESSURE: 110 MMHG | BODY MASS INDEX: 28.23 KG/M2 | WEIGHT: 190.6 LBS | OXYGEN SATURATION: 18 % | RESPIRATION RATE: 18 BRPM | HEIGHT: 69 IN | TEMPERATURE: 98.2 F

## 2021-04-22 DIAGNOSIS — R10.2 PELVIC PAIN: ICD-10-CM

## 2021-04-22 DIAGNOSIS — Z00.00 WELL ADULT EXAM: Primary | ICD-10-CM

## 2021-04-22 DIAGNOSIS — N89.8 VAGINAL DISCHARGE: ICD-10-CM

## 2021-04-22 PROCEDURE — G8419 CALC BMI OUT NRM PARAM NOF/U: HCPCS | Performed by: NURSE PRACTITIONER

## 2021-04-22 PROCEDURE — G8754 DIAS BP LESS 90: HCPCS | Performed by: NURSE PRACTITIONER

## 2021-04-22 PROCEDURE — G8752 SYS BP LESS 140: HCPCS | Performed by: NURSE PRACTITIONER

## 2021-04-22 PROCEDURE — G8427 DOCREV CUR MEDS BY ELIG CLIN: HCPCS | Performed by: NURSE PRACTITIONER

## 2021-04-22 PROCEDURE — G8432 DEP SCR NOT DOC, RNG: HCPCS | Performed by: NURSE PRACTITIONER

## 2021-04-22 PROCEDURE — G0439 PPPS, SUBSEQ VISIT: HCPCS | Performed by: NURSE PRACTITIONER

## 2021-04-22 PROCEDURE — 99213 OFFICE O/P EST LOW 20 MIN: CPT | Performed by: NURSE PRACTITIONER

## 2021-04-22 NOTE — PROGRESS NOTES
Subjective:   53 y.o. female for Well Woman Check. Her gyne and breast care is done elsewhere by her Ob-Gyne physician. Patient Active Problem List    Diagnosis Date Noted    Other fatigue 09/24/2019    Vitamin D deficiency 11/10/2015    Essential hypertension 07/15/2015    DDD (degenerative disc disease), lumbar 10/30/2014    Chronic back pain 10/30/2014    Abdominal pain, LUQ (left upper quadrant) 01/17/2012    Nausea & vomiting 01/17/2012    Sciatica 01/07/2011    Hypercholesterolemia 01/07/2011     Current Outpatient Medications   Medication Sig Dispense Refill    suvorexant (BELSOMRA) 10 mg tablet Take 1 Tab by mouth nightly as needed for Insomnia. Max Daily Amount: 10 mg. 30 Tab 2    EPINEPHrine (EPIPEN) 0.3 mg/0.3 mL injection INJECT THE CONTENTS OF ONE PEN INTO MUSCLE ONCE AS DIRECTED AS NEEDED 2 Syringe 2    LORazepam (ATIVAN) 0.5 mg tablet TAKE 1 TABLET BY MOUTH TWICE DAILY(MUST LAST 30 DAYS) 60 Tab 0    diclofenac EC (VOLTAREN) 75 mg EC tablet TAKE 1 TABLET BY MOUTH TWICE DAILY AS NEEDED FOR JOINT PAIN 60 Tab 5    pregabalin (LYRICA) 50 mg capsule Take 1 Cap by mouth two (2) times a day. Max Daily Amount: 100 mg. 60 Cap 1    ergocalciferol (ERGOCALCIFEROL) 1,250 mcg (50,000 unit) capsule TAKE 1 CAPSULE BY MOUTH EVERY 7 DAYS 12 Cap 3    atorvastatin (LIPITOR) 40 mg tablet TAKE 1 TABLET BY MOUTH EVERY DAY 90 Tab 3    DULoxetine (CYMBALTA) 60 mg capsule TAKE ONE CAPSULE BY MOUTH EVERY DAY 90 Cap 3    pantoprazole (PROTONIX) 40 mg tablet Take 1 Tab by mouth daily. 90 Tab 3    multivitamin (ONE A DAY) tablet Take 1 Tab by mouth daily.       VENTOLIN HFA 90 mcg/actuation inhaler INHALE 1 PUFF BY MOUTH EVERY 4 HOURS AS NEEDED 1 Inhaler 0     Family History   Problem Relation Age of Onset    Cancer Mother         melenoma    Cancer Father         melenoma    Cancer Brother         melenoma    Cancer Maternal Grandmother         uterine    Cancer Paternal Grandfather         lung    Cancer Other 32        niece has breast cancer     Social History     Tobacco Use    Smoking status: Current Every Day Smoker     Packs/day: 0.50     Years: 28.00     Pack years: 14.00     Types: Cigarettes    Smokeless tobacco: Former User     Quit date: 1/4/2013   Substance Use Topics    Alcohol use: No     Alcohol/week: 0.0 standard drinks         ROS: Feeling generally well. No TIA's or unusual headaches, no dysphagia. No prolonged cough. No dyspnea or chest pain on exertion. No abdominal pain, change in bowel habits, black or bloody stools. No urinary tract symptoms. No new or unusual musculoskeletal symptoms. Specific concerns today: having vaginal discharge with irritation, not sexualy active but with masterbating she can see bright blood. Objective: The patient appears well, alert, oriented x 3, in no distress. Visit Vitals  /72 (BP 1 Location: Left arm, BP Patient Position: Sitting, BP Cuff Size: Adult)   Pulse 72   Temp 98.2 °F (36.8 °C) (Oral)   Resp 18   Ht 5' 9\" (1.753 m)   Wt 190 lb 9.6 oz (86.5 kg)   SpO2 (!) 18%   BMI 28.15 kg/m²     ENT normal.  Neck supple. No adenopathy or thyromegaly. JHOANA. Lungs are clear, good air entry, no wheezes, rhonchi or rales. S1 and S2 normal, no murmurs, regular rate and rhythm. Abdomen soft without tenderness, guarding, mass or organomegaly. Extremities show no edema, normal peripheral pulses. Neurological is normal, no focal findings. Breast and Pelvic exams are deferred. Assessment/Plan:   Well Woman  lose weight, increase physical activity, follow low fat diet, follow low salt diet, routine labs ordered  Encounter Diagnoses   Name Primary?     Well adult exam Yes    Vaginal discharge     Pelvic pain      Orders Placed This Encounter    CULTURE, URINE    LIPID PANEL    METABOLIC PANEL, COMPREHENSIVE    CBC WITH AUTOMATED DIFF    TSH 3RD GENERATION    NUSWAB VAGINITIS PLUS     Urine culture sent out for pelvic pain and nuswab for discharge  Labs updated    I have discussed the diagnosis with the patient and the intended plan as seen in the above orders. The patient has received an after-visit summary and questions were answered concerning future plans. Patient conveyed understanding of the plan at the time of the visit.     Sixto Becker MSN, ANP  4/22/2021

## 2021-04-22 NOTE — PROGRESS NOTES
Chief Complaint   Patient presents with    Complete Physical    Well Woman     Patient presents in office today for CPE and Pap smear. 1. Have you been to the ER, urgent care clinic since your last visit? Hospitalized since your last visit? No    2. Have you seen or consulted any other health care providers outside of the [address] [place] [address] since your last visit? Include any pap smears or colon screening.  No

## 2021-04-23 LAB
ALBUMIN SERPL-MCNC: 4.1 G/DL (ref 3.5–5)
ALBUMIN/GLOB SERPL: 1.2 {RATIO} (ref 1.1–2.2)
ALP SERPL-CCNC: 123 U/L (ref 45–117)
ALT SERPL-CCNC: 26 U/L (ref 12–78)
ANION GAP SERPL CALC-SCNC: 6 MMOL/L (ref 5–15)
AST SERPL-CCNC: 15 U/L (ref 15–37)
BASOPHILS # BLD: 0.1 K/UL (ref 0–0.1)
BASOPHILS NFR BLD: 1 % (ref 0–1)
BILIRUB SERPL-MCNC: 0.6 MG/DL (ref 0.2–1)
BUN SERPL-MCNC: 11 MG/DL (ref 6–20)
BUN/CREAT SERPL: 16 (ref 12–20)
CALCIUM SERPL-MCNC: 9.6 MG/DL (ref 8.5–10.1)
CHLORIDE SERPL-SCNC: 106 MMOL/L (ref 97–108)
CHOLEST SERPL-MCNC: 239 MG/DL
CO2 SERPL-SCNC: 27 MMOL/L (ref 21–32)
CREAT SERPL-MCNC: 0.67 MG/DL (ref 0.55–1.02)
DIFFERENTIAL METHOD BLD: ABNORMAL
EOSINOPHIL # BLD: 0.2 K/UL (ref 0–0.4)
EOSINOPHIL NFR BLD: 3 % (ref 0–7)
ERYTHROCYTE [DISTWIDTH] IN BLOOD BY AUTOMATED COUNT: 12.7 % (ref 11.5–14.5)
GLOBULIN SER CALC-MCNC: 3.3 G/DL (ref 2–4)
GLUCOSE SERPL-MCNC: 92 MG/DL (ref 65–100)
HCT VFR BLD AUTO: 48.8 % (ref 35–47)
HDLC SERPL-MCNC: 50 MG/DL
HDLC SERPL: 4.8 {RATIO} (ref 0–5)
HGB BLD-MCNC: 15.7 G/DL (ref 11.5–16)
IMM GRANULOCYTES # BLD AUTO: 0 K/UL (ref 0–0.04)
IMM GRANULOCYTES NFR BLD AUTO: 0 % (ref 0–0.5)
LDLC SERPL CALC-MCNC: 156 MG/DL (ref 0–100)
LIPID PROFILE,FLP: ABNORMAL
LYMPHOCYTES # BLD: 4.3 K/UL (ref 0.8–3.5)
LYMPHOCYTES NFR BLD: 50 % (ref 12–49)
MCH RBC QN AUTO: 31 PG (ref 26–34)
MCHC RBC AUTO-ENTMCNC: 32.2 G/DL (ref 30–36.5)
MCV RBC AUTO: 96.4 FL (ref 80–99)
MONOCYTES # BLD: 0.5 K/UL (ref 0–1)
MONOCYTES NFR BLD: 6 % (ref 5–13)
NEUTS SEG # BLD: 3.5 K/UL (ref 1.8–8)
NEUTS SEG NFR BLD: 40 % (ref 32–75)
NRBC # BLD: 0 K/UL (ref 0–0.01)
NRBC BLD-RTO: 0 PER 100 WBC
PLATELET # BLD AUTO: 247 K/UL (ref 150–400)
PMV BLD AUTO: 11.1 FL (ref 8.9–12.9)
POTASSIUM SERPL-SCNC: 4.1 MMOL/L (ref 3.5–5.1)
PROT SERPL-MCNC: 7.4 G/DL (ref 6.4–8.2)
RBC # BLD AUTO: 5.06 M/UL (ref 3.8–5.2)
SODIUM SERPL-SCNC: 139 MMOL/L (ref 136–145)
TRIGL SERPL-MCNC: 165 MG/DL (ref ?–150)
TSH SERPL DL<=0.05 MIU/L-ACNC: 0.47 UIU/ML (ref 0.36–3.74)
VLDLC SERPL CALC-MCNC: 33 MG/DL
WBC # BLD AUTO: 8.6 K/UL (ref 3.6–11)

## 2021-04-24 LAB
A VAGINAE DNA VAG QL NAA+PROBE: ABNORMAL SCORE
BACTERIA SPEC CULT: NORMAL
BVAB2 DNA VAG QL NAA+PROBE: ABNORMAL SCORE
C ALBICANS DNA VAG QL NAA+PROBE: NEGATIVE
C GLABRATA DNA VAG QL NAA+PROBE: NEGATIVE
C TRACH DNA VAG QL NAA+PROBE: NEGATIVE
MEGA1 DNA VAG QL NAA+PROBE: ABNORMAL SCORE
N GONORRHOEA DNA VAG QL NAA+PROBE: NEGATIVE
SERVICE CMNT-IMP: NORMAL
SPECIMEN STATUS REPORT, ROLRST: NORMAL
T VAGINALIS DNA VAG QL NAA+PROBE: NEGATIVE

## 2021-04-28 RX ORDER — METRONIDAZOLE 500 MG/1
500 TABLET ORAL 2 TIMES DAILY
Qty: 14 TAB | Refills: 0 | Status: SHIPPED | OUTPATIENT
Start: 2021-04-28 | End: 2021-04-28 | Stop reason: SINTOL

## 2021-04-28 RX ORDER — METRONIDAZOLE 7.5 MG/G
1 GEL VAGINAL
Qty: 25 G | Refills: 0 | Status: SHIPPED | OUTPATIENT
Start: 2021-04-28 | End: 2021-05-03

## 2021-04-28 NOTE — PROGRESS NOTES
Hey there, your labs overall look great but your cholesterol is quite high. You really need to consider a cholesterol med. Lets recheck in 3 months fasting. Watch the diet for red meat/pork, dairy products, and fried/fast foods. The vaginal culture shows bacterial vaginosis which is a common vaginal infection. I am going to send in Flagyl to the pharmacy to . It is one twice a day for 7 days.  Deepa Hughes

## 2021-05-04 ENCOUNTER — TELEPHONE (OUTPATIENT)
Dept: FAMILY MEDICINE CLINIC | Age: 51
End: 2021-05-04

## 2021-05-04 DIAGNOSIS — G47.00 INSOMNIA, UNSPECIFIED TYPE: ICD-10-CM

## 2021-05-04 NOTE — PROGRESS NOTES
Spoke with pt she is aware of labs and verbalized understanding.  She states that she cant take the flygal but another med was sent in for her Subjective:       Patient ID: Anju Rivera is a 88 y.o. female.    Chief Complaint: Follow-up    HPI Comments: F/U:       Pt is a 88 year old who is doing well and is do for thyroid and CMP    Review of Systems   Constitutional: Negative.    Respiratory: Negative.    Cardiovascular: Negative.    Genitourinary: Negative.    Musculoskeletal: Negative.    Hematological: Negative.    Psychiatric/Behavioral: Negative.        Objective:      Physical Exam   Constitutional: She appears well-developed and well-nourished.   Neck: Normal range of motion. Neck supple.   Cardiovascular: Normal rate and regular rhythm.    Pulmonary/Chest: Effort normal and breath sounds normal.   Abdominal: Soft. Bowel sounds are normal.   Skin: Skin is warm and dry.   Psychiatric: She has a normal mood and affect. Her behavior is normal.       Assessment:       1. Essential hypertension    2. Hypothyroidism due to acquired atrophy of thyroid    3. Chronic diastolic heart failure    4. Chronic obstructive pulmonary disease, unspecified COPD type    5. Major depressive disorder with single episode, in remission    6. Bilateral carotid artery disease    7. Amputated great toe, left    8. Pain    9. Delayed immunizations        Plan:       Essential hypertension  Comments:  Pt BP is well controlled today  Orders:  -     losartan (COZAAR) 100 MG tablet; 1/2 tab a day  Dispense: 90 tablet; Refill: 3    Hypothyroidism due to acquired atrophy of thyroid  Comments:  Will do TSH and Free T4    Chronic diastolic heart failure  Comments:  Pt is stable at this point    Chronic obstructive pulmonary disease, unspecified COPD type  Comments:  Pt is stable onthe symbicort    Major depressive disorder with single episode, in remission  Comments:  Will continue on the celexa    Bilateral carotid artery disease  Comments:  BP is stable and monitoring cholesterol yearly    Amputated great toe, left  Comments:  Stable    Pain  Comments:  Will continue with the  norco.  Orders:  -     hydrocodone-acetaminophen 5-325mg (NORCO) 5-325 mg per tablet; Take 1 1/2 tablet at night  Dispense: 45 tablet; Refill: 0    Delayed immunizations  -     Pneumococcal Conjugate Vaccine (13 Valent) (IM)    Other orders  -     hydrochlorothiazide (MICROZIDE) 12.5 mg capsule; TAKE1 capsule BY MOUTH ONCE DAILY.  Dispense: 60 capsule; Refill: 11  -     clonazePAM (KLONOPIN) 1 MG tablet; Take 1.5 tablets (1.5 mg total) by mouth once daily.  Dispense: 45 tablet; Refill: 1

## 2021-05-04 NOTE — TELEPHONE ENCOUNTER
Please let her know that insurance has blocked [facility] on the formulary, fax received from insurance that they will only cover trazodone or belsomra period.  Peace Pitts

## 2021-05-04 NOTE — TELEPHONE ENCOUNTER
Pt states she cant take the belsomra that was sent in. She states this was not discussed with her and that she has been taking Ambien for years.  Pt states she would like to have Ambien sent to the pharmacy on file

## 2021-07-19 DIAGNOSIS — F41.1 GAD (GENERALIZED ANXIETY DISORDER): ICD-10-CM

## 2021-07-19 RX ORDER — LORAZEPAM 0.5 MG/1
TABLET ORAL
Qty: 60 TABLET | Refills: 0 | Status: SHIPPED | OUTPATIENT
Start: 2021-07-19 | End: 2022-05-25 | Stop reason: SDUPTHER

## 2021-07-19 RX ORDER — DULOXETIN HYDROCHLORIDE 60 MG/1
CAPSULE, DELAYED RELEASE ORAL
Qty: 90 CAPSULE | Refills: 3 | Status: SHIPPED | OUTPATIENT
Start: 2021-07-19 | End: 2022-05-25 | Stop reason: SDUPTHER

## 2021-07-19 RX ORDER — PANTOPRAZOLE SODIUM 40 MG/1
TABLET, DELAYED RELEASE ORAL
Qty: 90 TABLET | Refills: 3 | Status: SHIPPED | OUTPATIENT
Start: 2021-07-19 | End: 2022-08-03

## 2021-07-26 ENCOUNTER — TELEPHONE (OUTPATIENT)
Dept: FAMILY MEDICINE CLINIC | Age: 51
End: 2021-07-26

## 2021-07-26 NOTE — TELEPHONE ENCOUNTER
Pt calling and wants refill of the vaginal cream that she was given back in 4/21. I tried to ask pt what kind of symptoms she was having but refused to tell me and said all I want is a refill. She can be reached at [phone].

## 2021-07-27 RX ORDER — METRONIDAZOLE 7.5 MG/G
GEL TOPICAL 2 TIMES DAILY
Qty: 45 G | Refills: 2 | Status: SHIPPED | OUTPATIENT
Start: 2021-07-27 | End: 2021-08-03

## 2021-07-27 NOTE — TELEPHONE ENCOUNTER
Pt reports she has a previous botches surgery. She states that she has a sensation she doesn't care for, she has an odor and burning at the opening of her vagina.  She states she cant take flagyl

## 2021-12-07 RX ORDER — ATORVASTATIN CALCIUM 40 MG/1
40 TABLET, FILM COATED ORAL DAILY
Qty: 90 TABLET | Refills: 3 | Status: SHIPPED | OUTPATIENT
Start: 2021-12-07 | End: 2022-09-08 | Stop reason: SDUPTHER

## 2021-12-30 ENCOUNTER — NURSE TRIAGE (OUTPATIENT)
Dept: OTHER | Facility: CLINIC | Age: 51
End: 2021-12-30

## 2021-12-30 NOTE — TELEPHONE ENCOUNTER
Received call from [place] at [facility] with [employer]. Subjective: Caller states \"white spots in vision and headache\"     Current Symptoms:   + headache wraps around top of head  + skin sensitivity    Onset: 1 day ago; gradual    Associated Symptoms: numbness to skin around eye    Pain Severity: 8/10; sharp; constant    Temperature: none - does not feel feverish    What has been tried: tension headache medicine     LMP: none hysterectomy Pregnant: No    Recommended disposition: be seen within  Hours. ED of [address] or ED since PCP office is closed. Care advice provided, patient verbalizes understanding; denies any other questions or concerns; instructed to call back for any new or worsening symptoms. Patient proceeding to nearest [address]     Attention Provider: Thank you for allowing me to participate in the care of your patient. The patient was connected to triage in response to information provided to the [facility]. Please do not respond through this encounter as the response is not directed to a shared pool.       Reason for Disposition   [1] SEVERE headache (e.g., excruciating) AND [2] not improved after 2 hours of pain medicine    Protocols used: HEADACHE-ADULT-AH

## 2022-01-03 ENCOUNTER — TELEPHONE (OUTPATIENT)
Dept: FAMILY MEDICINE CLINIC | Age: 52
End: 2022-01-03

## 2022-01-03 NOTE — TELEPHONE ENCOUNTER
----- Message from Rivera Arndt sent at 12/30/2021  5:41 PM EST -----  Subject: Message to Provider    QUESTIONS  Information for Provider? patient with severe headache. worse pain she has   ever had. saw white spots earlier in day when she looked across room. went   to bed with slight headache and woke up with this seevre pain. patient   states she will go to walk in [address]. light sensitive and   concentrated around top of ear skin sore to touch.  ---------------------------------------------------------------------------  --------------  CALL BACK INFO  What is the best way for the office to contact you? OK to leave message on   voicemail  Preferred Call Back Phone Number? [phone]  ---------------------------------------------------------------------------  --------------  SCRIPT ANSWERS  Relationship to Patient?  Self

## 2022-01-04 NOTE — TELEPHONE ENCOUNTER
Spoke with pt, she states that she has been having headaches that are different than her normal migraines. She thinks they are stressed induced. Offered pt apt to come in but she declined and stated she will call back and let us know if they get worse.  I verbalized understanding

## 2022-02-08 ENCOUNTER — TELEPHONE (OUTPATIENT)
Dept: FAMILY MEDICINE CLINIC | Age: 52
End: 2022-02-08

## 2022-02-11 ENCOUNTER — OFFICE VISIT (OUTPATIENT)
Dept: FAMILY MEDICINE CLINIC | Age: 52
End: 2022-02-11
Payer: MEDICARE

## 2022-02-11 VITALS
BODY MASS INDEX: 29.68 KG/M2 | TEMPERATURE: 98.4 F | DIASTOLIC BLOOD PRESSURE: 87 MMHG | OXYGEN SATURATION: 96 % | RESPIRATION RATE: 16 BRPM | WEIGHT: 200.4 LBS | HEIGHT: 69 IN | HEART RATE: 87 BPM | SYSTOLIC BLOOD PRESSURE: 119 MMHG

## 2022-02-11 DIAGNOSIS — N76.0 ACUTE VAGINITIS: ICD-10-CM

## 2022-02-11 DIAGNOSIS — N89.8 VAGINAL DISCHARGE: Primary | ICD-10-CM

## 2022-02-11 PROCEDURE — G8427 DOCREV CUR MEDS BY ELIG CLIN: HCPCS | Performed by: NURSE PRACTITIONER

## 2022-02-11 PROCEDURE — G8419 CALC BMI OUT NRM PARAM NOF/U: HCPCS | Performed by: NURSE PRACTITIONER

## 2022-02-11 PROCEDURE — 3017F COLORECTAL CA SCREEN DOC REV: CPT | Performed by: NURSE PRACTITIONER

## 2022-02-11 PROCEDURE — 99213 OFFICE O/P EST LOW 20 MIN: CPT | Performed by: NURSE PRACTITIONER

## 2022-02-11 PROCEDURE — G8432 DEP SCR NOT DOC, RNG: HCPCS | Performed by: NURSE PRACTITIONER

## 2022-02-11 PROCEDURE — G8754 DIAS BP LESS 90: HCPCS | Performed by: NURSE PRACTITIONER

## 2022-02-11 PROCEDURE — G8752 SYS BP LESS 140: HCPCS | Performed by: NURSE PRACTITIONER

## 2022-02-11 NOTE — PROGRESS NOTES
Randee Aguirre is a 49 y.o. female    Chief Complaint   Patient presents with    Other     Pt stated that she was tested for Bacterial Vaginosis but she feels she still has it. 1. Have you been to the ER, urgent care clinic since your last visit? Hospitalized since your last visit? No    2. Have you seen or consulted any other health care providers outside of the [address] [place] [address] since your last visit? Include any pap smears or colon screening.  No

## 2022-02-12 NOTE — PROGRESS NOTES
Gerhardt Guillaume (: 1970) is a 49 y.o. female, established patient, here for evaluation of the following chief complaint(s): Other (Pt stated that she was tested for Bacterial Vaginosis but she feels she still has it. )       ASSESSMENT/PLAN:  Below is the assessment and plan developed based on review of pertinent history, physical exam, labs, studies, and medications. 1. Vaginal discharge  Swab sent for BV, Candida, gonorrhea, chlamydia, trichomonas, mycoplasma and Ureaplasma  Referred to gynecology for further evaluation and management  -     NUSWAB VG PLUS+MYCOPLASMAS,SAUL; Future  -     REFERRAL TO GYNECOLOGY      Return if symptoms worsen or fail to improve. I have discussed the diagnosis with the patient and the intended plan as seen in the above orders. The patient has received an after-visit summary and questions were answered concerning future plans. Patient conveyed understanding of the plan at the time of the visit. SUBJECTIVE/OBJECTIVE:  Presents for evaluation of vaginal discharge, abnormal vaginal odor. Complains of ongoing difficulty with excessive malodorous vaginal discharge. Patient reports after sexual activity she notes copious foul-smelling vaginal discharge. She also notes an uncomfortable feeling of pressure or bloating in lower abdomen and pelvis with intercourse. She was tested for BV and treated with MetroGel, but notes symptoms did not improve. She feels she needs treatment with a stronger antibiotic. However, she does report a history of C. difficile as well, and has been cautioned not to take Flagyl. Patient reports a hysterectomy in . She believes her ovaries and tubes were also removed. Patient finds the symptoms very distressing. They cause her to want to avoid intimacy in her relationship.     Past Medical History:   Diagnosis Date    ADHD (attention deficit hyperactivity disorder)     Arthritis     breast     right breast    CAD (coronary artery disease)     hardening of aortic valve    Chronic pain     back, legs, hips, knees, neck    Colitis     Fatty liver 2019    Fibromyalgia 2019    Gastrointestinal disorder     lap band     GERD (gastroesophageal reflux disease)     Morbid obesity (HCC)     Other ill-defined conditions(799.89)     loud snoring    Other ill-defined conditions(799.89) 2008    blood transfusion after hysterectomy hernandez heaton    Psychiatric disorder     bipolar, PTSD, ADD, depresssion    Unspecified adverse effect of anesthesia     intraop awareness    Unspecified adverse effect of anesthesia 2002    hot flashes post op after lumpectomy     Past Surgical History:   Procedure Laterality Date    HX APPENDECTOMY      age 16   Cynthia Goldberg CHOLECYSTECTOMY      HX DILATION AND CURETTAGE      HX GI      lab band removed    HX GYN      hysteroscopy    HX HYSTERECTOMY      2008    HX ORTHOPAEDIC      HX OTHER SURGICAL      cone biopsy    HX OTHER SURGICAL  2003    colonoscopy with polyp removal    HX OTHER SURGICAL      multiple laparoscopy surgeries    HX OTHER SURGICAL      AnteriorCervicalDiscectomyFusion surgery    HX UROLOGICAL  2008    bladder tack    NH BREAST SURGERY PROCEDURE UNLISTED      11/7/02 lumpectomy right breast    NH LAP, PLACE ADJUST GASTR BAND       .   Family History   Problem Relation Age of Onset    Cancer Mother         melenoma    Cancer Father         melenoma    Cancer Brother         melenoma    Cancer Maternal Grandmother         uterine    Cancer Paternal Grandfather         lung    Cancer Other 32        niece has breast cancer     Social History     Tobacco Use    Smoking status: Current Every Day Smoker     Packs/day: 0.50     Years: 28.00     Pack years: 14.00     Types: Cigarettes    Smokeless tobacco: Former User     Quit date: 1/4/2013   Substance Use Topics    Alcohol use: No     Alcohol/week: 0.0 standard drinks       Review of Systems   Constitutional: Negative for activity change, appetite change, chills, diaphoresis, fatigue and fever. HENT: Negative. Eyes: Negative. Respiratory: Negative. Cardiovascular: Negative. Gastrointestinal: Negative. Genitourinary: Positive for pelvic pain and vaginal discharge. Negative for vaginal bleeding and vaginal pain. Musculoskeletal: Negative. Visit Vitals  /87 (BP 1 Location: Left upper arm, BP Patient Position: Sitting, BP Cuff Size: Adult)   Pulse 87   Temp 98.4 °F (36.9 °C) (Oral)   Resp 16   Ht 5' 9\" (1.753 m)   Wt 200 lb 6.4 oz (90.9 kg)   SpO2 96%   BMI 29.59 kg/m²     Physical Examination: General appearance - alert, well appearing, and in no distress and oriented to person, place, and time  Mental status - normal mood, behavior, speech, dress, motor activity, and thought processes  Eyes - sclera anicteric  Neck - supple, no significant adenopathy, thyroid exam: thyroid is normal in size without nodules or tenderness  Chest - clear to auscultation, no wheezes, rales or rhonchi, symmetric air entry  Heart - normal rate, regular rhythm, normal S1, S2, no murmurs, rubs, clicks or gallops, normal bilateral carotid upstroke without bruits, no JVD  Abdomen - soft, nontender, nondistended, no masses or organomegaly  bowel sounds normal  Neurological - alert, oriented, normal speech, no focal findings or movement disorder noted  Extremities - no pedal edema noted, intact peripheral pulses, no edema, redness or tenderness in the calves or thighs  Skin - normal coloration and turgor, no rashes    Pelvic exam: VULVA: normal appearing vulva with no masses, tenderness or lesions, VAGINA: vaginal discharge - white and thin, CERVIX: surgically absent, UTERUS: surgically absent, vaginal cuff well healed, ADNEXA: normal adnexa in size, nontender and no masses. An electronic signature was used to authenticate this note.   -- Koko Delgado NP   2/11/2022

## 2022-02-15 ENCOUNTER — TELEPHONE (OUTPATIENT)
Dept: FAMILY MEDICINE CLINIC | Age: 52
End: 2022-02-15

## 2022-02-15 NOTE — TELEPHONE ENCOUNTER
Spoke to pt. Patient x2 id verified. Informed pt that we didn't receive the results yet and we will update you once we get it. Pt verbalized understanding.

## 2022-02-15 NOTE — TELEPHONE ENCOUNTER
----- Message from Janeth Peralta sent at 2/14/2022  5:17 PM EST -----  Subject: Results Request    QUESTIONS  Which lab or imaging result is the patient calling about? pap  Which provider ordered the test?   At what location was the test performed? Date the test was performed? 2022-02-11  Additional Information for Provider? SHe was calling to see what her   results are and to have them call back. ---------------------------------------------------------------------------  --------------  Jefferson HASSAN  What is the best way for the office to contact you? OK to leave message on   voicemail  Preferred Call Back Phone Number?  [phone]

## 2022-02-18 LAB
A VAGINAE DNA VAG QL NAA+PROBE: ABNORMAL SCORE
BVAB2 DNA VAG QL NAA+PROBE: ABNORMAL SCORE
C ALBICANS DNA VAG QL NAA+PROBE: NEGATIVE
C GLABRATA DNA VAG QL NAA+PROBE: NEGATIVE
C TRACH DNA VAG QL NAA+PROBE: NEGATIVE
M GENITALIUM DNA SPEC QL NAA+PROBE: NEGATIVE
M HOMINIS DNA SPEC QL NAA+PROBE: POSITIVE
MEGA1 DNA VAG QL NAA+PROBE: ABNORMAL SCORE
N GONORRHOEA DNA VAG QL NAA+PROBE: NEGATIVE
T VAGINALIS DNA VAG QL NAA+PROBE: NEGATIVE
UREAPLASMA DNA SPEC QL NAA+PROBE: POSITIVE

## 2022-02-18 RX ORDER — CLINDAMYCIN PHOSPHATE 20 MG/G
1 CREAM VAGINAL
Qty: 40 G | Refills: 0 | Status: SHIPPED | OUTPATIENT
Start: 2022-02-18 | End: 2022-06-22 | Stop reason: ALTCHOICE

## 2022-02-18 RX ORDER — AZITHROMYCIN 250 MG/1
1000 TABLET, FILM COATED ORAL
Qty: 4 TABLET | Refills: 0 | Status: SHIPPED | OUTPATIENT
Start: 2022-02-18 | End: 2022-02-18

## 2022-02-18 NOTE — PROGRESS NOTES
Swab was positive for BV and also mycoplasm and ureaplasm infections. Recommend treatment with clindamycin vaginal crea and azithromycin, rx sent to pharmacy.

## 2022-02-21 DIAGNOSIS — G47.00 INSOMNIA, UNSPECIFIED TYPE: ICD-10-CM

## 2022-03-02 ENCOUNTER — TELEPHONE (OUTPATIENT)
Dept: FAMILY MEDICINE CLINIC | Age: 52
End: 2022-03-02

## 2022-03-02 NOTE — TELEPHONE ENCOUNTER
----- Message from Ramiro Maciel sent at 3/2/2022 11:04 AM EST -----  Subject: Refill Request    QUESTIONS  Name of Medication? LORazepam (ATIVAN) 0.5 mg tablet  Patient-reported dosage and instructions? 0.5 MG tablet 2 times per day as   needed  How many days do you have left? 0  Preferred Pharmacy? [id number]  Pharmacy phone number (if available)? [phone]  Additional Information for Provider? Patient asked if she can have a   higher dose of this.   ---------------------------------------------------------------------------  --------------  CALL BACK INFO  What is the best way for the office to contact you? OK to leave message on   voicemail  Preferred Call Back Phone Number?  [phone]

## 2022-03-02 NOTE — TELEPHONE ENCOUNTER
Spoke with pt, she states that she is not in a good place. She states she is not going ot hurt herself she is reaching out for help before it get that far. She states that she wanted to seek therapy. Informed I could help her with that and encouraged her to call her insurance as she had questions about co-pays.  She states she will call then call back so we can help set her up with therapy

## 2022-03-18 PROBLEM — R53.83 OTHER FATIGUE: Status: ACTIVE | Noted: 2019-09-24

## 2022-05-25 ENCOUNTER — VIRTUAL VISIT (OUTPATIENT)
Dept: FAMILY MEDICINE CLINIC | Age: 52
End: 2022-05-25
Payer: MEDICARE

## 2022-05-25 DIAGNOSIS — F32.A DEPRESSION, UNSPECIFIED DEPRESSION TYPE: Primary | ICD-10-CM

## 2022-05-25 DIAGNOSIS — F41.1 GAD (GENERALIZED ANXIETY DISORDER): ICD-10-CM

## 2022-05-25 PROCEDURE — 99213 OFFICE O/P EST LOW 20 MIN: CPT | Performed by: NURSE PRACTITIONER

## 2022-05-25 PROCEDURE — 3017F COLORECTAL CA SCREEN DOC REV: CPT | Performed by: NURSE PRACTITIONER

## 2022-05-25 PROCEDURE — G8756 NO BP MEASURE DOC: HCPCS | Performed by: NURSE PRACTITIONER

## 2022-05-25 PROCEDURE — G8427 DOCREV CUR MEDS BY ELIG CLIN: HCPCS | Performed by: NURSE PRACTITIONER

## 2022-05-25 PROCEDURE — G8432 DEP SCR NOT DOC, RNG: HCPCS | Performed by: NURSE PRACTITIONER

## 2022-05-25 RX ORDER — LORAZEPAM 0.5 MG/1
0.5 TABLET ORAL
Qty: 30 TABLET | Refills: 0 | Status: SHIPPED | OUTPATIENT
Start: 2022-05-25 | End: 2022-06-22 | Stop reason: SDUPTHER

## 2022-05-25 RX ORDER — DULOXETIN HYDROCHLORIDE 60 MG/1
CAPSULE, DELAYED RELEASE ORAL
Qty: 30 CAPSULE | Refills: 5 | Status: SHIPPED | OUTPATIENT
Start: 2022-05-25

## 2022-05-25 NOTE — PROGRESS NOTES
Chief Complaint   Patient presents with    Medication Evaluation     Pt being seen for med eval  -pt states she needs help  -pt states she stopped all meds     1. Have you been to the ER, urgent care clinic since your last visit? Hospitalized since your last visit? No    2. Have you seen or consulted any other health care providers outside of the [address] [place] [address] since your last visit? Include any pap smears or colon screening.  No     Pt has no other concerns

## 2022-05-25 NOTE — PROGRESS NOTES
Mikhail Razo (: 1970) is a 47 y.o. female, established patient, here for evaluation of the following chief complaint(s):   Medication Evaluation       ASSESSMENT/PLAN:  Below is the assessment and plan developed based on review of pertinent history, labs, studies, and medications. Diagnoses and all orders for this visit:    1. Depression, unspecified depression type    2. PAVEL (generalized anxiety disorder)  -     LORazepam (ATIVAN) 0.5 mg tablet; Take 1 Tablet by mouth two (2) times daily as needed for Anxiety. Max Daily Amount: 1 mg. TAKE 1 TABLET BY MOUTH TWICE DAILY(MUST LAST 30 DAYS)    Other orders  -     DULoxetine (CYMBALTA) 60 mg capsule; TAKE 1 CAPSULE BY MOUTH EVERY DAY    All medications were restarted and sent to her pharmacy on file. Follow-up 3 weeks for progress of symptoms. ADRs and side effects of the medication were reviewed      SUBJECTIVE/OBJECTIVE:  HPI  Patient presents today for a virtual visit to reestablish her anxiety and depression medication. She stopped all of her meds to do a medication cleanse because she had been on them for so long. She has since completely crashed emotionally is tearful, will not  leave the house, cries every day and is having panic attacks. She realizes now that this was a mistake. She has no refills of any of her medications on file and has been off of them for several months.     Review of Systems   A comprehensive review of system was obtained and negative except findings in the HPI      No data recorded     Physical Exam    [INSTRUCTIONS:  \"[x]\" Indicates a positive item  \"[]\" Indicates a negative item  -- DELETE ALL ITEMS NOT EXAMINED]    Constitutional: [x] Appears well-developed and well-nourished [x] No apparent distress      [] Abnormal -     Mental status: [x] Alert and awake  [x] Oriented to person/place/time [x] Able to follow commands    [] Abnormal -     Eyes:   EOM    [x]  Normal    [] Abnormal -   Sclera  [x]  Normal    [] Abnormal -          Discharge [x]  None visible   [] Abnormal -     HENT: [x] Normocephalic, atraumatic  [] Abnormal -   [x] Mouth/Throat: Mucous membranes are moist    External Ears [x] Normal  [] Abnormal -    Neck: [x] No visualized mass [] Abnormal -     Pulmonary/Chest: [x] Respiratory effort normal   [x] No visualized signs of difficulty breathing or respiratory distress        [] Abnormal -      Musculoskeletal:   [x] Normal gait with no signs of ataxia         [x] Normal range of motion of neck        [] Abnormal -     Neurological:        [x] No Facial Asymmetry (Cranial nerve 7 motor function) (limited exam due to video visit)          [x] No gaze palsy        [] Abnormal -          Skin:        [x] No significant exanthematous lesions or discoloration noted on facial skin         [] Abnormal -            Psychiatric:       [x] Normal Affect [] Abnormal -        [x] No Hallucinations    Other pertinent observable physical exam findings:-        On this date 05/25/2022 I have spent 15 minutes reviewing previous notes, test results and face to face (virtual) with the patient discussing the diagnosis and importance of compliance with the treatment plan as well as documenting on the day of the visit. Marcela Jarrett, was evaluated through a synchronous (real-time) audio-video encounter. The patient (or guardian if applicable) is aware that this is a billable service, which includes applicable co-pays. This Virtual Visit was conducted with patient's (and/or legal guardian's) consent. The visit was conducted pursuant to the emergency declaration under the [address] [place] [address] and the [employer] General Act. Patient identification was verified, and a caregiver was present when appropriate. The patient was located at: Home: [place] [id number]  The provider was located at:  Facility (Appt Department): [address]       An electronic signature was used to authenticate this note.   -- Brent Llanos, NP

## 2022-06-22 ENCOUNTER — VIRTUAL VISIT (OUTPATIENT)
Dept: FAMILY MEDICINE CLINIC | Age: 52
End: 2022-06-22
Payer: MEDICARE

## 2022-06-22 DIAGNOSIS — F31.9 BIPOLAR 1 DISORDER (HCC): ICD-10-CM

## 2022-06-22 DIAGNOSIS — F41.1 GAD (GENERALIZED ANXIETY DISORDER): Primary | ICD-10-CM

## 2022-06-22 DIAGNOSIS — M79.7 FIBROMYALGIA: ICD-10-CM

## 2022-06-22 DIAGNOSIS — M51.36 DDD (DEGENERATIVE DISC DISEASE), LUMBAR: ICD-10-CM

## 2022-06-22 PROCEDURE — G9717 DOC PT DX DEP/BP F/U NT REQ: HCPCS | Performed by: NURSE PRACTITIONER

## 2022-06-22 PROCEDURE — 3017F COLORECTAL CA SCREEN DOC REV: CPT | Performed by: NURSE PRACTITIONER

## 2022-06-22 PROCEDURE — G8756 NO BP MEASURE DOC: HCPCS | Performed by: NURSE PRACTITIONER

## 2022-06-22 PROCEDURE — G8419 CALC BMI OUT NRM PARAM NOF/U: HCPCS | Performed by: NURSE PRACTITIONER

## 2022-06-22 PROCEDURE — 99214 OFFICE O/P EST MOD 30 MIN: CPT | Performed by: NURSE PRACTITIONER

## 2022-06-22 PROCEDURE — G8427 DOCREV CUR MEDS BY ELIG CLIN: HCPCS | Performed by: NURSE PRACTITIONER

## 2022-06-22 RX ORDER — ARIPIPRAZOLE 10 MG/1
10 TABLET ORAL DAILY
Qty: 30 TABLET | Refills: 5 | Status: SHIPPED | OUTPATIENT
Start: 2022-06-22 | End: 2022-08-23 | Stop reason: ALTCHOICE

## 2022-06-22 RX ORDER — LORAZEPAM 0.5 MG/1
0.5 TABLET ORAL
Qty: 30 TABLET | Refills: 0 | Status: SHIPPED | OUTPATIENT
Start: 2022-06-22

## 2022-06-22 RX ORDER — DICLOFENAC SODIUM 75 MG/1
TABLET, DELAYED RELEASE ORAL
Qty: 60 TABLET | Refills: 5 | Status: SHIPPED | OUTPATIENT
Start: 2022-06-22

## 2022-06-22 NOTE — PROGRESS NOTES
Chief Complaint   Patient presents with    Medication Refill     Pt being seen for med refill  -pt states she has been having pain in her spine and wants to discuss med for pain    1. Have you been to the ER, urgent care clinic since your last visit? Hospitalized since your last visit? No    2. Have you seen or consulted any other health care providers outside of the [address] [place] [address] since your last visit? Include any pap smears or colon screening.  No     Pt has no other concerns

## 2022-06-22 NOTE — PROGRESS NOTES
Elinor Gale (: 1970) is a 47 y.o. female, established patient, here for evaluation of the following chief complaint(s):   Medication Refill       ASSESSMENT/PLAN:  Below is the assessment and plan developed based on review of pertinent history, labs, studies, and medications. Diagnoses and all orders for this visit:    1. PAVEL (generalized anxiety disorder)  -     LORazepam (ATIVAN) 0.5 mg tablet; Take 1 Tablet by mouth two (2) times daily as needed for Anxiety. Max Daily Amount: 1 mg. TAKE 1 TABLET BY MOUTH TWICE DAILY(MUST LAST 30 DAYS)    2. Bipolar 1 disorder ([address]    3. Fibromyalgia  -     diclofenac EC (VOLTAREN) 75 mg EC tablet; TAKE 1 TABLET BY MOUTH TWICE DAILY AS NEEDED FOR JOINT PAIN    4. DDD (degenerative disc disease), lumbar    Other orders  -     ARIPiprazole (ABILIFY) 10 mg tablet; Take 1 Tablet by mouth daily. - 7pm      Start Abilify 10mg a day with the Cymbalta  Recheck 4 weeks with med check and labs        No follow-ups on file. SUBJECTIVE/OBJECTIVE:  HPI  She is here today for follow up depression/anxiety meds  She was started on Cymbalta 60 mg a day at her last visit. A long way to go to have improvement in depression and anxiety. She does not want to leave the house still. She might see a small improvement with the Cymbalta.   She admits that she has been diagnosed with bipolar in the past    Review of Systems   A comprehensive review of system was obtained and negative except findings in the HPI      No data recorded     Physical Exam    [INSTRUCTIONS:  \"[x]\" Indicates a positive item  \"[]\" Indicates a negative item  -- DELETE ALL ITEMS NOT EXAMINED]    Constitutional: [x] Appears well-developed and well-nourished [x] No apparent distress      [] Abnormal -     Mental status: [x] Alert and awake  [x] Oriented to person/place/time [x] Able to follow commands    [] Abnormal -     Eyes:   EOM    [x]  Normal    [] Abnormal -   Sclera  [x]  Normal    [] Abnormal - Discharge [x]  None visible   [] Abnormal -     HENT: [x] Normocephalic, atraumatic  [] Abnormal -   [x] Mouth/Throat: Mucous membranes are moist    External Ears [x] Normal  [] Abnormal -    Neck: [x] No visualized mass [] Abnormal -     Pulmonary/Chest: [x] Respiratory effort normal   [x] No visualized signs of difficulty breathing or respiratory distress        [] Abnormal -      Musculoskeletal:   [x] Normal gait with no signs of ataxia         [x] Normal range of motion of neck        [] Abnormal -     Neurological:        [x] No Facial Asymmetry (Cranial nerve 7 motor function) (limited exam due to video visit)          [x] No gaze palsy        [] Abnormal -          Skin:        [x] No significant exanthematous lesions or discoloration noted on facial skin         [] Abnormal -            Psychiatric:       [x] Normal Affect [] Abnormal -        [x] No Hallucinations    Other pertinent observable physical exam findings:-        On this date 06/22/2022 I have spent 20 minutes reviewing previous notes, test results and face to face (virtual) with the patient discussing the diagnosis and importance of compliance with the treatment plan as well as documenting on the day of the visitJosiane Trang Dahl, was evaluated through a synchronous (real-time) audio-video encounter. The patient (or guardian if applicable) is aware that this is a billable service, which includes applicable co-pays. This Virtual Visit was conducted with patient's (and/or legal guardian's) consent. The visit was conducted pursuant to the emergency declaration under the [address] [place] [address], [address] [place] [address] and the [employer] and [employer] General Act. Patient identification was verified, and a caregiver was present when appropriate.   The patient was located at: Home: [place] [id number]  The provider was located at: Home:        An electronic signature was used to authenticate this note.   -- Chau Epperson, NP

## 2022-08-03 RX ORDER — PANTOPRAZOLE SODIUM 40 MG/1
TABLET, DELAYED RELEASE ORAL
Qty: 90 TABLET | Refills: 3 | Status: SHIPPED | OUTPATIENT
Start: 2022-08-03

## 2022-08-23 ENCOUNTER — OFFICE VISIT (OUTPATIENT)
Dept: FAMILY MEDICINE CLINIC | Age: 52
End: 2022-08-23
Payer: MEDICARE

## 2022-08-23 VITALS
RESPIRATION RATE: 20 BRPM | WEIGHT: 194 LBS | DIASTOLIC BLOOD PRESSURE: 67 MMHG | OXYGEN SATURATION: 95 % | HEART RATE: 82 BPM | TEMPERATURE: 98.7 F | HEIGHT: 69 IN | SYSTOLIC BLOOD PRESSURE: 95 MMHG | BODY MASS INDEX: 28.73 KG/M2

## 2022-08-23 DIAGNOSIS — Z85.3 HISTORY OF RIGHT BREAST CANCER: ICD-10-CM

## 2022-08-23 DIAGNOSIS — Z12.11 SCREENING FOR COLON CANCER: ICD-10-CM

## 2022-08-23 DIAGNOSIS — Z00.00 WELL ADULT EXAM: Primary | ICD-10-CM

## 2022-08-23 LAB
ALBUMIN SERPL-MCNC: 3.6 G/DL (ref 3.5–5)
ALBUMIN/GLOB SERPL: 1 {RATIO} (ref 1.1–2.2)
ALP SERPL-CCNC: 112 U/L (ref 45–117)
ALT SERPL-CCNC: 35 U/L (ref 12–78)
ANION GAP SERPL CALC-SCNC: 7 MMOL/L (ref 5–15)
AST SERPL-CCNC: 19 U/L (ref 15–37)
BASOPHILS # BLD: 0.1 K/UL (ref 0–0.1)
BASOPHILS NFR BLD: 1 % (ref 0–1)
BILIRUB SERPL-MCNC: 0.9 MG/DL (ref 0.2–1)
BUN SERPL-MCNC: 11 MG/DL (ref 6–20)
BUN/CREAT SERPL: 13 (ref 12–20)
CALCIUM SERPL-MCNC: 9.5 MG/DL (ref 8.5–10.1)
CHLORIDE SERPL-SCNC: 107 MMOL/L (ref 97–108)
CHOLEST SERPL-MCNC: 256 MG/DL
CO2 SERPL-SCNC: 25 MMOL/L (ref 21–32)
CREAT SERPL-MCNC: 0.82 MG/DL (ref 0.55–1.02)
DIFFERENTIAL METHOD BLD: NORMAL
EOSINOPHIL # BLD: 0.1 K/UL (ref 0–0.4)
EOSINOPHIL NFR BLD: 2 % (ref 0–7)
ERYTHROCYTE [DISTWIDTH] IN BLOOD BY AUTOMATED COUNT: 12.5 % (ref 11.5–14.5)
GLOBULIN SER CALC-MCNC: 3.7 G/DL (ref 2–4)
GLUCOSE SERPL-MCNC: 111 MG/DL (ref 65–100)
HCT VFR BLD AUTO: 46.3 % (ref 35–47)
HDLC SERPL-MCNC: 53 MG/DL
HDLC SERPL: 4.8 {RATIO} (ref 0–5)
HGB BLD-MCNC: 15.6 G/DL (ref 11.5–16)
IMM GRANULOCYTES # BLD AUTO: 0 K/UL (ref 0–0.04)
IMM GRANULOCYTES NFR BLD AUTO: 0 % (ref 0–0.5)
LDLC SERPL CALC-MCNC: 177.8 MG/DL (ref 0–100)
LYMPHOCYTES # BLD: 3.3 K/UL (ref 0.8–3.5)
LYMPHOCYTES NFR BLD: 41 % (ref 12–49)
MCH RBC QN AUTO: 31.3 PG (ref 26–34)
MCHC RBC AUTO-ENTMCNC: 33.7 G/DL (ref 30–36.5)
MCV RBC AUTO: 92.8 FL (ref 80–99)
MONOCYTES # BLD: 0.4 K/UL (ref 0–1)
MONOCYTES NFR BLD: 6 % (ref 5–13)
NEUTS SEG # BLD: 4.1 K/UL (ref 1.8–8)
NEUTS SEG NFR BLD: 50 % (ref 32–75)
NRBC # BLD: 0 K/UL (ref 0–0.01)
NRBC BLD-RTO: 0 PER 100 WBC
PLATELET # BLD AUTO: 254 K/UL (ref 150–400)
PMV BLD AUTO: 10.4 FL (ref 8.9–12.9)
POTASSIUM SERPL-SCNC: 4 MMOL/L (ref 3.5–5.1)
PROT SERPL-MCNC: 7.3 G/DL (ref 6.4–8.2)
RBC # BLD AUTO: 4.99 M/UL (ref 3.8–5.2)
SODIUM SERPL-SCNC: 139 MMOL/L (ref 136–145)
TRIGL SERPL-MCNC: 126 MG/DL (ref ?–150)
TSH SERPL DL<=0.05 MIU/L-ACNC: 0.69 UIU/ML (ref 0.36–3.74)
VLDLC SERPL CALC-MCNC: 25.2 MG/DL
WBC # BLD AUTO: 8 K/UL (ref 3.6–11)

## 2022-08-23 PROCEDURE — G9717 DOC PT DX DEP/BP F/U NT REQ: HCPCS | Performed by: NURSE PRACTITIONER

## 2022-08-23 PROCEDURE — G8754 DIAS BP LESS 90: HCPCS | Performed by: NURSE PRACTITIONER

## 2022-08-23 PROCEDURE — G0439 PPPS, SUBSEQ VISIT: HCPCS | Performed by: NURSE PRACTITIONER

## 2022-08-23 PROCEDURE — 3017F COLORECTAL CA SCREEN DOC REV: CPT | Performed by: NURSE PRACTITIONER

## 2022-08-23 PROCEDURE — G8419 CALC BMI OUT NRM PARAM NOF/U: HCPCS | Performed by: NURSE PRACTITIONER

## 2022-08-23 PROCEDURE — G8752 SYS BP LESS 140: HCPCS | Performed by: NURSE PRACTITIONER

## 2022-08-23 PROCEDURE — G8427 DOCREV CUR MEDS BY ELIG CLIN: HCPCS | Performed by: NURSE PRACTITIONER

## 2022-08-23 NOTE — PATIENT INSTRUCTIONS
Medicare Wellness Visit, Female     The best way to live healthy is to have a lifestyle where you eat a well-balanced diet, exercise regularly, limit alcohol use, and quit all forms of tobacco/nicotine, if applicable. Regular preventive services are another way to keep healthy. Preventive services (vaccines, screening tests, monitoring & exams) can help personalize your care plan, which helps you manage your own care. Screening tests can find health problems at the earliest stages, when they are easiest to treat. [facility] follows the current, evidence-based guidelines published by the [facility] ([facility]) when recommending preventive services for our patients. Because we follow these guidelines, sometimes recommendations change over time as research supports it. (For example, mammograms used to be recommended annually. Even though Medicare will still pay for an annual mammogram, the newer guidelines recommend a mammogram every two years for women of average risk). Of course, you and your doctor may decide to screen more often for some diseases, based on your risk and your co-morbidities (chronic disease you are already diagnosed with). Preventive services for you include:  - Medicare offers their members a free annual wellness visit, which is time for you and your primary care provider to discuss and plan for your preventive service needs. Take advantage of this benefit every year!  -All adults over the age of 72 should receive the recommended pneumonia vaccines. Current USPSTF guidelines recommend a series of two vaccines for the best pneumonia protection.   -All adults should have a flu vaccine yearly and a tetanus vaccine every 10 years.   -All adults age 48 and older should receive the shingles vaccines (series of two vaccines).       -All adults age 38-68 who are overweight should have a diabetes screening test once every three years.   -All adults born between [birth year] and 1964 should be screened once for Hepatitis C.  -Other screening tests and preventive services for persons with diabetes include: an eye exam to screen for diabetic retinopathy, a kidney function test, a foot exam, and stricter control over your cholesterol.   -Cardiovascular screening for adults with routine risk involves an electrocardiogram (ECG) at intervals determined by your doctor.   -Colorectal cancer screenings should be done for adults age 54-65 with no increased risk factors for colorectal cancer. There are a number of acceptable methods of screening for this type of cancer. Each test has its own benefits and drawbacks. Discuss with your doctor what is most appropriate for you during your annual wellness visit. The different tests include: colonoscopy (considered the best screening method), a fecal occult blood test, a fecal DNA test, and sigmoidoscopy.    -A bone mass density test is recommended when a woman turns 65 to screen for osteoporosis. This test is only recommended one time, as a screening. Some providers will use this same test as a disease monitoring tool if you already have osteoporosis. -Breast cancer screenings are recommended every other year for women of normal risk, age 54-69.  -Cervical cancer screenings for women over age 72 are only recommended with certain risk factors.      Here is a list of your current Health Maintenance items (your personalized list of preventive services) with a due date:  Health Maintenance Due   Topic Date Due    Hepatitis C Test  Never done    COVID-19 Vaccine (1) Never done    Pneumococcal Vaccine (1 - PCV) Never done    DTaP/Tdap/Td  (1 - Tdap) Never done    Shingles Vaccine (1 of 2) Never done    Mammogram  04/28/2021    Cholesterol Test   04/22/2022    Colorectal Screening  05/08/2022

## 2022-08-23 NOTE — PROGRESS NOTES
This is the Subsequent Medicare Annual Wellness Exam, performed 12 months or more after the Initial AWV or the last Subsequent AWV    I have reviewed the patient's medical history in detail and updated the computerized patient record. She is also due for fasting labs  Bp has been in good range  Mood is very anxious, stopped abilify due to fatigue  Does not have psych  Did have right breast CA and feels a staple at times in the scar  Previous surgeon is gone    Assessment/Plan   Education and counseling provided:  Are appropriate based on today's review and evaluation    1. Well adult exam  -     CBC WITH AUTOMATED DIFF; Future  -     METABOLIC PANEL, COMPREHENSIVE; Future  -     TSH 3RD GENERATION; Future  -     LIPID PANEL; Future  2.  History of right breast cancer  -     REFERRAL TO BREAST SURGERY  3. Screening for colon cancer  -     REFERRAL TO GASTROENTEROLOGY       Depression Risk Factor Screening     3 most recent PHQ Screens 8/23/2022   PHQ Not Done Functional capacity motivation limits accuracy   Little interest or pleasure in doing things -   Feeling down, depressed, irritable, or hopeless -   Total Score PHQ 2 -   Trouble falling or staying asleep, or sleeping too much -   Feeling tired or having little energy -   Poor appetite, weight loss, or overeating -   Feeling bad about yourself - or that you are a failure or have let yourself or your family down -   Trouble concentrating on things such as school, work, reading, or watching TV -   Moving or speaking so slowly that other people could have noticed; or the opposite being so fidgety that others notice -   Thoughts of being better off dead, or hurting yourself in some way -   PHQ 9 Score -   How difficult have these problems made it for you to do your work, take care of your home and get along with others -       Alcohol & Drug Abuse Risk Screen    Do you average more than 1 drink per night or more than 7 drinks a week:  No    On any one occasion in the past three months have you have had more than 3 drinks containing alcohol:  No          Functional Ability and Level of Safety    Hearing: Hearing is good. Activities of Daily Living: The home contains: no safety equipment. Patient does total self care      Ambulation: with no difficulty     Fall Risk:  Fall Risk Assessment, last 12 mths 8/23/2022   Able to walk? Yes   Fall in past 12 months? 0   Do you feel unsteady? 0   Are you worried about falling 0      Abuse Screen:  Patient is not abused       Cognitive Screening    Has your family/caregiver stated any concerns about your memory: no     Cognitive Screening: Normal - Mini Cog Test    Health Maintenance Due     Health Maintenance Due   Topic Date Due    Hepatitis C Screening  Never done    COVID-19 Vaccine (1) Never done    Pneumococcal 0-64 years (1 - PCV) Never done    DTaP/Tdap/Td series (1 - Tdap) Never done    Shingrix Vaccine Age 50> (1 of 2) Never done    Breast Cancer Screen Mammogram  04/28/2021    Lipid Screen  04/22/2022    Medicare Yearly Exam  04/23/2022    Colorectal Cancer Screening Combo  05/08/2022       Patient Care Team   Patient Care Team:  Jesika Eden NP as PCP - General (Family Medicine)  Jesika Eden NP as PCP - [facility]  Julian Pham LPN as [facility]  Luli Lobo MD as Physician Assistant (Gastroenterology)    History     Patient Active Problem List   Diagnosis Code    Sciatica M54.30    Hypercholesterolemia E78.00    Abdominal pain, LUQ (left upper quadrant) R10.12    Nausea & vomiting R11.2    DDD (degenerative disc disease), lumbar M51.36    Chronic back pain M54.9, G89.29    Essential hypertension I10    Vitamin D deficiency E55.9    Other fatigue R53.83    Depression F32. A     Past Medical History:   Diagnosis Date    ADHD (attention deficit hyperactivity disorder)     Arthritis     breast 2002    right breast    CAD (coronary artery disease)     hardening of aortic valve Chronic pain     back, legs, hips, knees, neck    Colitis     Fatty liver 2019    Fibromyalgia 2019    Gastrointestinal disorder     lap band     GERD (gastroesophageal reflux disease)     Morbid obesity ([facility]     Other ill-defined conditions(799.89)     loud snoring    Other ill-defined conditions(799.89) 2008    blood transfusion after hysterectomy mary heaton    Psychiatric disorder     bipolar, PTSD, ADD, depresssion    Unspecified adverse effect of anesthesia     intraop awareness    Unspecified adverse effect of anesthesia 2002    hot flashes post op after lumpectomy      Past Surgical History:   Procedure Laterality Date    HX APPENDECTOMY      age 16    HX CHOLECYSTECTOMY      HX DILATION AND CURETTAGE      HX GI      lab band removed    HX GYN      hysteroscopy    HX HYSTERECTOMY      2008    HX ORTHOPAEDIC      HX OTHER SURGICAL      cone biopsy    HX OTHER SURGICAL  2003    colonoscopy with polyp removal    HX OTHER SURGICAL      multiple laparoscopy surgeries    HX OTHER SURGICAL      AnteriorCervicalDiscectomyFusion surgery    HX UROLOGICAL  2008    bladder tack    VT BREAST SURGERY PROCEDURE UNLISTED      11/7/02 lumpectomy right breast    VT LAP, PLACE ADJUST GASTR BAND       Current Outpatient Medications   Medication Sig Dispense Refill    pantoprazole (PROTONIX) 40 mg tablet TAKE 1 TABLET BY MOUTH DAILY 90 Tablet 3    LORazepam (ATIVAN) 0.5 mg tablet Take 1 Tablet by mouth two (2) times daily as needed for Anxiety. Max Daily Amount: 1 mg. TAKE 1 TABLET BY MOUTH TWICE DAILY(MUST LAST 30 DAYS) 30 Tablet 0    diclofenac EC (VOLTAREN) 75 mg EC tablet TAKE 1 TABLET BY MOUTH TWICE DAILY AS NEEDED FOR JOINT PAIN 60 Tablet 5    DULoxetine (CYMBALTA) 60 mg capsule TAKE 1 CAPSULE BY MOUTH EVERY DAY 30 Capsule 5    atorvastatin (LIPITOR) 40 mg tablet Take 1 Tablet by mouth daily.  90 Tablet 3     Allergies   Allergen Reactions    Beef Containing Products Anaphylaxis    Percocet [Oxycodone-Acetaminophen] Itching     Pt states not allergic to medication    Codeine Nausea and Vomiting    Flagyl [Metronidazole] Other (comments)     c-diff    Nuts [Tree Nut] Unknown (comments)     Allergy tested.     Pork Derived (Porcine) Unknown (comments)     Allergy tested       Family History   Problem Relation Age of Onset    Cancer Mother         melenoma    Cancer Father         melenoma    Cancer Brother         melenoma    Cancer Maternal Grandmother         uterine    Cancer Paternal Grandfather         lung    Cancer Other 32        niece has breast cancer     Social History     Tobacco Use    Smoking status: Every Day     Packs/day: 0.50     Years: 28.00     Pack years: 14.00     Types: Cigarettes    Smokeless tobacco: Former     Quit date: 1/4/2013   Substance Use Topics    Alcohol use: No     Alcohol/week: 0.0 standard drinks         Kavya Ortega NP

## 2022-08-23 NOTE — PROGRESS NOTES
Chief Complaint   Patient presents with    Annual Wellness Visit    Labs     Pt being seen for wellness visit   -labs    1. Have you been to the ER, urgent care clinic since your last visit? Hospitalized since your last visit? No    2. Have you seen or consulted any other health care providers outside of the [address] [place] [address] since your last visit? Include any pap smears or colon screening.  No    Pt has no other concerns

## 2022-08-25 ENCOUNTER — TELEPHONE (OUTPATIENT)
Dept: SURGERY | Age: 52
End: 2022-08-25

## 2022-08-25 NOTE — LETTER
8/25/2022 2:08 PM    [address] [facility] [address]      Greetings! [address] has received a referral for you to be seen with one of our providers. Unfortunately, we have been unable to reach you by phone. Please give our office a call at your earliest convenience to schedule an appointment. Our office phone number is [phone]. For your convenience, we do have three locations! [facility] [place] [address], [address], [facility] [place] [address], [address] [place], [address], [facility], [address] [place] [address], [facility] [address]    Check out our website!  [email or web address]          Sincerely,      Sol Auugst MD

## 2022-08-25 NOTE — TELEPHONE ENCOUNTER
Attempted to contact patient regarding referral. Person who answered the phone stated patient was not available. Letter will be sent to address on file.

## 2022-08-30 NOTE — PROGRESS NOTES
Your cholesterol is through the roof, are you taking your Lipitor? The rest of the labs look great for thyroid, sugar, and kidney/liver functions.  Shabbir Dan

## 2022-09-08 ENCOUNTER — TELEPHONE (OUTPATIENT)
Dept: FAMILY MEDICINE CLINIC | Age: 52
End: 2022-09-08

## 2022-09-08 RX ORDER — EPINEPHRINE 0.3 MG/.3ML
0.3 INJECTION SUBCUTANEOUS
Qty: 0.6 ML | Refills: 1 | Status: SHIPPED | OUTPATIENT
Start: 2022-09-08 | End: 2022-09-08

## 2022-09-08 RX ORDER — ATORVASTATIN CALCIUM 40 MG/1
40 TABLET, FILM COATED ORAL DAILY
Qty: 90 TABLET | Refills: 3 | Status: SHIPPED | OUTPATIENT
Start: 2022-09-08

## 2022-09-08 NOTE — TELEPHONE ENCOUNTER
Pt is aware of labs, [facility]

## 2022-09-08 NOTE — TELEPHONE ENCOUNTER
Patient would like to get her lab results. She cannot get into mychart. She would like to be called back today.

## 2023-05-28 ENCOUNTER — APPOINTMENT (OUTPATIENT)
Facility: HOSPITAL | Age: 53
End: 2023-05-28
Payer: MEDICARE

## 2023-05-28 ENCOUNTER — HOSPITAL ENCOUNTER (OUTPATIENT)
Facility: HOSPITAL | Age: 53
Setting detail: OBSERVATION
Discharge: HOME | End: 2023-05-31
Attending: EMERGENCY MEDICINE | Admitting: HOSPITALIST
Payer: MEDICARE

## 2023-05-28 DIAGNOSIS — R20.2 NUMBNESS AND TINGLING OF LEFT SIDE OF FACE: ICD-10-CM

## 2023-05-28 DIAGNOSIS — R20.0 NUMBNESS AND TINGLING OF LEFT SIDE OF FACE: ICD-10-CM

## 2023-05-28 DIAGNOSIS — F32.A DEPRESSION, UNSPECIFIED DEPRESSION TYPE: ICD-10-CM

## 2023-05-28 DIAGNOSIS — R20.2 ARM PARESTHESIA, LEFT: Primary | ICD-10-CM

## 2023-05-28 LAB
ALBUMIN SERPL-MCNC: 3.5 G/DL (ref 3.5–5)
ALBUMIN/GLOB SERPL: 0.9 (ref 1.1–2.2)
ALP SERPL-CCNC: 122 U/L (ref 45–117)
ALT SERPL-CCNC: 28 U/L (ref 12–78)
ANION GAP SERPL CALC-SCNC: 6 MMOL/L (ref 5–15)
AST SERPL W P-5'-P-CCNC: 18 U/L (ref 15–37)
BASOPHILS # BLD: 0.1 K/UL (ref 0–0.1)
BASOPHILS NFR BLD: 1 % (ref 0–1)
BILIRUB SERPL-MCNC: 0.3 MG/DL (ref 0.2–1)
BUN SERPL-MCNC: 13 MG/DL (ref 6–20)
BUN/CREAT SERPL: 16 (ref 12–20)
CA-I BLD-MCNC: 9.6 MG/DL (ref 8.5–10.1)
CHLORIDE SERPL-SCNC: 109 MMOL/L (ref 97–108)
CO2 SERPL-SCNC: 25 MMOL/L (ref 21–32)
CREAT SERPL-MCNC: 0.8 MG/DL (ref 0.55–1.02)
DIFFERENTIAL METHOD BLD: ABNORMAL
EOSINOPHIL # BLD: 0.2 K/UL (ref 0–0.4)
EOSINOPHIL NFR BLD: 2 % (ref 0–7)
ERYTHROCYTE [DISTWIDTH] IN BLOOD BY AUTOMATED COUNT: 12.2 % (ref 11.5–14.5)
GLOBULIN SER CALC-MCNC: 3.7 G/DL (ref 2–4)
GLUCOSE SERPL-MCNC: 113 MG/DL (ref 65–100)
HCT VFR BLD AUTO: 45.2 % (ref 35–47)
HGB BLD-MCNC: 15.5 G/DL (ref 11.5–16)
IMM GRANULOCYTES # BLD AUTO: 0 K/UL (ref 0–0.04)
IMM GRANULOCYTES NFR BLD AUTO: 0 % (ref 0–0.5)
INR PPP: 1 (ref 0.9–1.1)
LYMPHOCYTES # BLD: 4.7 K/UL (ref 0.8–3.5)
LYMPHOCYTES NFR BLD: 55 % (ref 12–49)
MCH RBC QN AUTO: 31.6 PG (ref 26–34)
MCHC RBC AUTO-ENTMCNC: 34.3 G/DL (ref 30–36.5)
MCV RBC AUTO: 92.1 FL (ref 80–99)
MONOCYTES # BLD: 0.6 K/UL (ref 0–1)
MONOCYTES NFR BLD: 6 % (ref 5–13)
NEUTS SEG # BLD: 3.1 K/UL (ref 1.8–8)
NEUTS SEG NFR BLD: 36 % (ref 32–75)
NRBC # BLD: 0 K/UL (ref 0–0.01)
NRBC BLD-RTO: 0 PER 100 WBC
PLATELET # BLD AUTO: 253 K/UL (ref 150–400)
PMV BLD AUTO: 11.2 FL (ref 8.9–12.9)
POTASSIUM SERPL-SCNC: 3.6 MMOL/L (ref 3.5–5.1)
PROT SERPL-MCNC: 7.2 G/DL (ref 6.4–8.2)
PROTHROMBIN TIME: 12.9 SEC (ref 11.9–14.6)
RBC # BLD AUTO: 4.91 M/UL (ref 3.8–5.2)
SODIUM SERPL-SCNC: 140 MMOL/L (ref 136–145)
TROPONIN I SERPL HS-MCNC: 5 NG/L (ref 0–51)
WBC # BLD AUTO: 8.7 K/UL (ref 3.6–11)

## 2023-05-28 PROCEDURE — 36415 COLL VENOUS BLD VENIPUNCTURE: CPT

## 2023-05-28 PROCEDURE — 71045 X-RAY EXAM CHEST 1 VIEW: CPT

## 2023-05-28 PROCEDURE — 80053 COMPREHEN METABOLIC PANEL: CPT

## 2023-05-28 PROCEDURE — 99285 EMERGENCY DEPT VISIT HI MDM: CPT

## 2023-05-28 PROCEDURE — 85610 PROTHROMBIN TIME: CPT

## 2023-05-28 PROCEDURE — 85025 COMPLETE CBC W/AUTO DIFF WBC: CPT

## 2023-05-28 PROCEDURE — 70498 CT ANGIOGRAPHY NECK: CPT

## 2023-05-28 PROCEDURE — 93005 ELECTROCARDIOGRAM TRACING: CPT | Performed by: EMERGENCY MEDICINE

## 2023-05-28 PROCEDURE — 6360000004 HC RX CONTRAST MEDICATION: Performed by: EMERGENCY MEDICINE

## 2023-05-28 PROCEDURE — 70450 CT HEAD/BRAIN W/O DYE: CPT

## 2023-05-28 PROCEDURE — 84484 ASSAY OF TROPONIN QUANT: CPT

## 2023-05-28 RX ORDER — ONDANSETRON 2 MG/ML
4 INJECTION INTRAMUSCULAR; INTRAVENOUS EVERY 6 HOURS PRN
Status: DISCONTINUED | OUTPATIENT
Start: 2023-05-28 | End: 2023-05-31 | Stop reason: HOSPADM

## 2023-05-28 RX ORDER — SODIUM CHLORIDE 9 MG/ML
INJECTION, SOLUTION INTRAVENOUS PRN
Status: DISCONTINUED | OUTPATIENT
Start: 2023-05-28 | End: 2023-05-31 | Stop reason: HOSPADM

## 2023-05-28 RX ORDER — SODIUM CHLORIDE 0.9 % (FLUSH) 0.9 %
5-40 SYRINGE (ML) INJECTION PRN
Status: DISCONTINUED | OUTPATIENT
Start: 2023-05-28 | End: 2023-05-31 | Stop reason: HOSPADM

## 2023-05-28 RX ORDER — ONDANSETRON 4 MG/1
4 TABLET, ORALLY DISINTEGRATING ORAL EVERY 8 HOURS PRN
Status: DISCONTINUED | OUTPATIENT
Start: 2023-05-28 | End: 2023-05-31 | Stop reason: HOSPADM

## 2023-05-28 RX ORDER — SODIUM CHLORIDE 0.9 % (FLUSH) 0.9 %
5-40 SYRINGE (ML) INJECTION EVERY 12 HOURS SCHEDULED
Status: DISCONTINUED | OUTPATIENT
Start: 2023-05-28 | End: 2023-05-31 | Stop reason: HOSPADM

## 2023-05-28 RX ADMIN — IOPAMIDOL 100 ML: 755 INJECTION, SOLUTION INTRAVENOUS at 21:47

## 2023-05-28 ASSESSMENT — PAIN SCALES - GENERAL: PAINLEVEL_OUTOF10: 0

## 2023-05-28 ASSESSMENT — LIFESTYLE VARIABLES
HOW MANY STANDARD DRINKS CONTAINING ALCOHOL DO YOU HAVE ON A TYPICAL DAY: PATIENT DOES NOT DRINK
HOW OFTEN DO YOU HAVE A DRINK CONTAINING ALCOHOL: NEVER

## 2023-05-29 ENCOUNTER — APPOINTMENT (OUTPATIENT)
Facility: HOSPITAL | Age: 53
End: 2023-05-29
Payer: MEDICARE

## 2023-05-29 ENCOUNTER — APPOINTMENT (OUTPATIENT)
Facility: HOSPITAL | Age: 53
End: 2023-05-29
Attending: HOSPITALIST
Payer: MEDICARE

## 2023-05-29 LAB
ECHO AO ROOT DIAM: 2.8 CM
ECHO AO ROOT INDEX: 1.39 CM/M2
ECHO AV PEAK GRADIENT: 12 MMHG
ECHO AV PEAK VELOCITY: 1.7 M/S
ECHO AV VELOCITY RATIO: 0.59
ECHO BSA: 2.05 M2
ECHO LA DIAMETER INDEX: 1.44 CM/M2
ECHO LA DIAMETER: 2.9 CM
ECHO LA TO AORTIC ROOT RATIO: 1.04
ECHO LV E' SEPTAL VELOCITY: 11 CM/S
ECHO LV FRACTIONAL SHORTENING: 34 % (ref 28–44)
ECHO LV INTERNAL DIMENSION DIASTOLE INDEX: 2.48 CM/M2
ECHO LV INTERNAL DIMENSION DIASTOLIC: 5 CM (ref 3.9–5.3)
ECHO LV INTERNAL DIMENSION SYSTOLIC INDEX: 1.63 CM/M2
ECHO LV INTERNAL DIMENSION SYSTOLIC: 3.3 CM
ECHO LV IVSD: 0.9 CM (ref 0.6–0.9)
ECHO LV MASS 2D: 169.9 G (ref 67–162)
ECHO LV MASS INDEX 2D: 84.1 G/M2 (ref 43–95)
ECHO LV POSTERIOR WALL DIASTOLIC: 1 CM (ref 0.6–0.9)
ECHO LV RELATIVE WALL THICKNESS RATIO: 0.4
ECHO LVOT PEAK GRADIENT: 4 MMHG
ECHO LVOT PEAK VELOCITY: 1 M/S
ECHO MV A VELOCITY: 0.88 M/S
ECHO MV E DECELERATION TIME (DT): 157 MS
ECHO MV E VELOCITY: 0.95 M/S
ECHO MV E/A RATIO: 1.08
ECHO MV E/E' SEPTAL: 8.64
ECHO PV MAX VELOCITY: 1.2 M/S
ECHO PV PEAK GRADIENT: 5 MMHG
ECHO PVEIN A DURATION: 113 MS
ECHO PVEIN A VELOCITY: 0.3 M/S
ECHO RA AREA 4C: 15.6 CM2
ECHO RA END SYSTOLIC VOLUME APICAL 4 CHAMBER INDEX BSA: 22 ML/M2
ECHO RA VOLUME: 44 ML
ECHO RV BASAL DIMENSION: 3.4 CM
EKG ATRIAL RATE: 77 BPM
EKG DIAGNOSIS: NORMAL
EKG P AXIS: 30 DEGREES
EKG P-R INTERVAL: 146 MS
EKG Q-T INTERVAL: 394 MS
EKG QRS DURATION: 90 MS
EKG QTC CALCULATION (BAZETT): 445 MS
EKG R AXIS: 40 DEGREES
EKG T AXIS: 43 DEGREES
EKG VENTRICULAR RATE: 77 BPM
TROPONIN I SERPL HS-MCNC: 5 NG/L (ref 0–51)

## 2023-05-29 PROCEDURE — 6370000000 HC RX 637 (ALT 250 FOR IP): Performed by: INTERNAL MEDICINE

## 2023-05-29 PROCEDURE — 71260 CT THORAX DX C+: CPT

## 2023-05-29 PROCEDURE — 2580000003 HC RX 258: Performed by: HOSPITALIST

## 2023-05-29 PROCEDURE — 6370000000 HC RX 637 (ALT 250 FOR IP): Performed by: HOSPITALIST

## 2023-05-29 PROCEDURE — G0378 HOSPITAL OBSERVATION PER HR: HCPCS

## 2023-05-29 PROCEDURE — 2580000003 HC RX 258: Performed by: INTERNAL MEDICINE

## 2023-05-29 PROCEDURE — 6360000004 HC RX CONTRAST MEDICATION: Performed by: INTERNAL MEDICINE

## 2023-05-29 PROCEDURE — 93306 TTE W/DOPPLER COMPLETE: CPT

## 2023-05-29 PROCEDURE — 70551 MRI BRAIN STEM W/O DYE: CPT

## 2023-05-29 RX ORDER — ACYCLOVIR 800 MG/1
800 TABLET ORAL 3 TIMES DAILY
Status: DISCONTINUED | OUTPATIENT
Start: 2023-05-29 | End: 2023-05-31 | Stop reason: HOSPADM

## 2023-05-29 RX ORDER — ATORVASTATIN CALCIUM 40 MG/1
40 TABLET, FILM COATED ORAL DAILY
Status: DISCONTINUED | OUTPATIENT
Start: 2023-05-29 | End: 2023-05-31 | Stop reason: HOSPADM

## 2023-05-29 RX ORDER — PANTOPRAZOLE SODIUM 40 MG/1
40 TABLET, DELAYED RELEASE ORAL DAILY
Status: DISCONTINUED | OUTPATIENT
Start: 2023-05-29 | End: 2023-05-31 | Stop reason: HOSPADM

## 2023-05-29 RX ORDER — ASPIRIN 325 MG
325 TABLET ORAL ONCE
Status: DISCONTINUED | OUTPATIENT
Start: 2023-05-29 | End: 2023-05-31 | Stop reason: HOSPADM

## 2023-05-29 RX ORDER — LORAZEPAM 0.5 MG/1
0.5 TABLET ORAL 2 TIMES DAILY PRN
Status: DISCONTINUED | OUTPATIENT
Start: 2023-05-29 | End: 2023-05-31 | Stop reason: HOSPADM

## 2023-05-29 RX ORDER — SODIUM CHLORIDE 9 MG/ML
INJECTION, SOLUTION INTRAVENOUS CONTINUOUS
Status: DISCONTINUED | OUTPATIENT
Start: 2023-05-29 | End: 2023-05-31 | Stop reason: HOSPADM

## 2023-05-29 RX ORDER — DULOXETIN HYDROCHLORIDE 30 MG/1
60 CAPSULE, DELAYED RELEASE ORAL DAILY
Status: DISCONTINUED | OUTPATIENT
Start: 2023-05-29 | End: 2023-05-31 | Stop reason: HOSPADM

## 2023-05-29 RX ADMIN — SODIUM CHLORIDE, PRESERVATIVE FREE 10 ML: 5 INJECTION INTRAVENOUS at 21:09

## 2023-05-29 RX ADMIN — SODIUM CHLORIDE, PRESERVATIVE FREE 10 ML: 5 INJECTION INTRAVENOUS at 09:10

## 2023-05-29 RX ADMIN — LORAZEPAM 0.5 MG: 0.5 TABLET ORAL at 09:14

## 2023-05-29 RX ADMIN — LORAZEPAM 0.5 MG: 0.5 TABLET ORAL at 21:07

## 2023-05-29 RX ADMIN — PANTOPRAZOLE SODIUM 40 MG: 40 TABLET, DELAYED RELEASE ORAL at 09:08

## 2023-05-29 RX ADMIN — DULOXETINE HYDROCHLORIDE 60 MG: 30 CAPSULE, DELAYED RELEASE ORAL at 09:08

## 2023-05-29 RX ADMIN — IOPAMIDOL 100 ML: 755 INJECTION, SOLUTION INTRAVENOUS at 17:42

## 2023-05-29 RX ADMIN — ACYCLOVIR 800 MG: 800 TABLET ORAL at 21:05

## 2023-05-29 RX ADMIN — ATORVASTATIN CALCIUM 40 MG: 40 TABLET, FILM COATED ORAL at 09:08

## 2023-05-29 RX ADMIN — SODIUM CHLORIDE: 9 INJECTION, SOLUTION INTRAVENOUS at 21:08

## 2023-05-29 NOTE — ED PROVIDER NOTES
[facility] EMERGENCY DEPT  EMERGENCY DEPARTMENT HISTORY AND PHYSICAL EXAM      Date: 5/28/2023  Patient Name: Arti Wadsworth  MRN: [id number]  Jacques 1970  Date of evaluation: 5/28/2023  Provider: Betzaida Issa MD   Note Started: 9:32 PM EDT 5/28/23    HISTORY OF PRESENT ILLNESS     Chief Complaint   Patient presents with    Tingling       History Provided By: Patient    HPI: Arti Wadsworth is a 47 y.o. female complaining of tingling to the left arm with a cardiac history. Noticed a bump sensation on the right side of her face for the tingling was on the left.     PAST MEDICAL HISTORY   Past Medical History:  Past Medical History:   Diagnosis Date    ADHD (attention deficit hyperactivity disorder)     Arthritis     CAD (coronary artery disease)     hardening of aortic valve    Cancer ([address] 2002    right breast    Chronic pain     back, legs, hips, knees, neck    Colitis     Fatty liver 2019    Fibromyalgia 2019    Gastrointestinal disorder     lap band     GERD (gastroesophageal reflux disease)     Morbid obesity ([address]     Other ill-defined conditions(799.89)     loud snoring    Other ill-defined conditions(799.89) 2008    blood transfusion after hysterectomy mike lemus    Psychiatric disorder     bipolar, PTSD, ADD, depresssion    Unspecified adverse effect of anesthesia     intraop awareness    Unspecified adverse effect of anesthesia 2002    hot flashes post op after lumpectomy       Past Surgical History:  Past Surgical History:   Procedure Laterality Date    APPENDECTOMY      age 16    BREAST SURGERY      11/7/02 lumpectomy right breast    CHOLECYSTECTOMY      DILATION AND CURETTAGE OF UTERUS      GI      lab band removed    GYN      hysteroscopy    HYSTERECTOMY (CERVIX STATUS UNKNOWN)      2008    LAP, PLACE ADJUST GASTR BAND      ORTHOPEDIC SURGERY      OTHER SURGICAL HISTORY      multiple laparoscopy surgeries    OTHER SURGICAL HISTORY      cone biopsy    OTHER SURGICAL HISTORY

## 2023-05-29 NOTE — H&P
Hospitalist History & Physical Notes. Maddison Quiroga. Name : Ashly Fair      MRN number : [id number]     YOB: 1970     Subjective :   Chief Complaint : Complains of left-sided face numbness with tingling. Source of information : Patient, ED provider, previous records. History of present illness:   Ashly Fair is  47 y.o. female with history of multiple medical problems as mentioned below in past medical history presents to the emergency room complaining of left-sided facial tingling. States it started on the forehead and did spread over to the left side of the face. She states it is just the left half of the face. Denies any vision disturbance, headache. She complains of a jaw problem 3 days ago, after that today she noticed a small swelling in the right mandibular angle. She denies any local tenderness and just worried what it is.     Past Medical History:   Diagnosis Date    ADHD (attention deficit hyperactivity disorder)     Arthritis     CAD (coronary artery disease)     hardening of aortic valve    Cancer ([facility] [address] 2002    right breast    Chronic pain     back, legs, hips, knees, neck    Colitis     Fatty liver 2019    Fibromyalgia 2019    Gastrointestinal disorder     lap band     GERD (gastroesophageal reflux disease)     Morbid obesity ([address]     Other ill-defined conditions(799.89)     loud snoring    Other ill-defined conditions(799.89) 2008    blood transfusion after hysterectomy mike lemus    Psychiatric disorder     bipolar, PTSD, ADD, depresssion    Unspecified adverse effect of anesthesia     intraop awareness    Unspecified adverse effect of anesthesia 2002    hot flashes post op after lumpectomy     Past Surgical History:   Procedure Laterality Date    APPENDECTOMY      age 16    BREAST SURGERY      11/7/02 lumpectomy right breast    CHOLECYSTECTOMY      DILATION AND CURETTAGE OF UTERUS      GI      lab band removed    GYN

## 2023-05-29 NOTE — CARE COORDINATION
05/29/23 1127   Service Assessment   Patient Orientation Alert and Oriented   Cognition Alert   History Provided By Patient   Primary [address]   Patient's Healthcare Decision Maker is: Legal Next of Kin   PCP Verified by CM Yes   Last Visit to PCP Within last 3 months   Prior Functional Level Independent in ADLs/IADLs   Current Functional Level Independent in ADLs/IADLs   Can patient return to prior living arrangement Yes   Ability to make needs known: Good   Family able to assist with home care needs: Yes   Would you like for me to discuss the discharge plan with any other family members/significant others, and if so, who? Yes   Financial Resources Medicare   Community Resources None   Social/Functional History   Lives With Alone   Type of [address] None   Receives Help From Other (comment)  (NA)   ADL Assistance Independent   Homemaking Assistance Independent   Ambulation Assistance Independent   Transfer Assistance Independent   Active  Yes         Confirmed address, lives alone. Independent with ADLs/IADLs. Currently able to drive/no transport issues, at this time. No home services. States she suffers from depression and seems overwhelmed. Patient is not interested in psychiatric referral at [facility] time.

## 2023-05-29 NOTE — ED TRIAGE NOTES
Patient has tingling and numbness on L side of face, L arm and L leg that started this afternoon. Patient stating she has also noticed a mass on her R side of her jaw.  Patient has strong bi lateral  strength, A&OX4,

## 2023-05-30 ENCOUNTER — TELEPHONE (OUTPATIENT)
Age: 53
End: 2023-05-30

## 2023-05-30 LAB
CRP SERPL-MCNC: <0.29 MG/DL (ref 0–0.6)
ERYTHROCYTE [SEDIMENTATION RATE] IN BLOOD: 9 MM/HR (ref 0–30)
TSH SERPL DL<=0.05 MIU/L-ACNC: 0.24 UIU/ML (ref 0.36–3.74)

## 2023-05-30 PROCEDURE — 6370000000 HC RX 637 (ALT 250 FOR IP): Performed by: HOSPITALIST

## 2023-05-30 PROCEDURE — 85652 RBC SED RATE AUTOMATED: CPT

## 2023-05-30 PROCEDURE — 84443 ASSAY THYROID STIM HORMONE: CPT

## 2023-05-30 PROCEDURE — 86140 C-REACTIVE PROTEIN: CPT

## 2023-05-30 PROCEDURE — 36415 COLL VENOUS BLD VENIPUNCTURE: CPT

## 2023-05-30 PROCEDURE — G0378 HOSPITAL OBSERVATION PER HR: HCPCS

## 2023-05-30 PROCEDURE — 2580000003 HC RX 258: Performed by: HOSPITALIST

## 2023-05-30 PROCEDURE — 6370000000 HC RX 637 (ALT 250 FOR IP): Performed by: INTERNAL MEDICINE

## 2023-05-30 RX ORDER — LORAZEPAM 0.5 MG/1
0.5 TABLET ORAL 2 TIMES DAILY PRN
Qty: 6 TABLET | Refills: 0 | Status: SHIPPED | OUTPATIENT
Start: 2023-05-30 | End: 2023-06-02

## 2023-05-30 RX ORDER — ACYCLOVIR 800 MG/1
800 TABLET ORAL 3 TIMES DAILY
Qty: 30 TABLET | Refills: 0 | Status: SHIPPED | OUTPATIENT
Start: 2023-05-30 | End: 2023-06-09

## 2023-05-30 RX ADMIN — SODIUM CHLORIDE, PRESERVATIVE FREE 10 ML: 5 INJECTION INTRAVENOUS at 19:43

## 2023-05-30 RX ADMIN — ACYCLOVIR 800 MG: 800 TABLET ORAL at 19:43

## 2023-05-30 RX ADMIN — PANTOPRAZOLE SODIUM 40 MG: 40 TABLET, DELAYED RELEASE ORAL at 10:49

## 2023-05-30 RX ADMIN — SODIUM CHLORIDE, PRESERVATIVE FREE 10 ML: 5 INJECTION INTRAVENOUS at 10:51

## 2023-05-30 RX ADMIN — ACYCLOVIR 800 MG: 800 TABLET ORAL at 15:29

## 2023-05-30 RX ADMIN — ATORVASTATIN CALCIUM 40 MG: 40 TABLET, FILM COATED ORAL at 10:49

## 2023-05-30 RX ADMIN — LORAZEPAM 0.5 MG: 0.5 TABLET ORAL at 15:29

## 2023-05-30 RX ADMIN — DULOXETINE HYDROCHLORIDE 60 MG: 30 CAPSULE, DELAYED RELEASE ORAL at 10:49

## 2023-05-30 RX ADMIN — ACYCLOVIR 800 MG: 800 TABLET ORAL at 10:50

## 2023-05-30 RX ADMIN — SODIUM CHLORIDE, PRESERVATIVE FREE 10 ML: 5 INJECTION INTRAVENOUS at 19:44

## 2023-05-30 RX ADMIN — LORAZEPAM 0.5 MG: 0.5 TABLET ORAL at 19:43

## 2023-05-30 ASSESSMENT — ENCOUNTER SYMPTOMS
EYES NEGATIVE: 1
RESPIRATORY NEGATIVE: 1
GASTROINTESTINAL NEGATIVE: 1

## 2023-05-30 NOTE — TELEPHONE ENCOUNTER
----- Message from Yari Meza sent at 5/30/2023  2:32 PM EDT -----  Subject: Hospital Follow Up    QUESTIONS  What hospital was the Patient Discharged from? [facility]  Date of Discharge? 2023-05-30  Discharge Location? Home  Reason for hospitalization as patient stated? numbness & tingling of L   side of face (admitted on 5/28)  What question does the patient have, if applicable?   ---------------------------------------------------------------------------  --------------  CALL BACK INFO  What is the best way for the office to contact you? OK to leave message on   voicemail  Preferred Call Back Phone Number? [phone]  ---------------------------------------------------------------------------  --------------  SCRIPT ANSWERS  Relationship to Patient? Covered Entity  Covered Entity Type? Hospital?  Representative Name?  Richardson Lanes

## 2023-05-30 NOTE — CONSULTS
Consult Note            Date:5/30/2023        Patient Maria Elena Bourgeois     Date of Birth:[birth year]     Age:53 y.o. Reason for Consult:  Painful Lymph Node in neck     Chief Complaint     Chief Complaint   Patient presents with    Tingling        History Obtained From   patient    History of Present Illness   This is a 47 y.o. female who presented to the ED on 5/28/2023 with left sided weakness and numbness and tingling. She states she initially had right jaw pain but then it stopped and her symptoms moved to the left side of her body. She states this started in her head and progressively got worse. She became concerned when it began to spread and come to the ED> She has a known past medical history of right breast cancer s/p lumpectomy and chemotherapy in 2016, CAD, ADHD, chronic pain. She denies headache, dizziness, visual changes at this time. She still has numbness on the left side of her body. She appears very anxious, and she is fearful she has cancer again.      Past Medical History     Past Medical History:   Diagnosis Date    ADHD (attention deficit hyperactivity disorder)     Arthritis     CAD (coronary artery disease)     hardening of aortic valve    Cancer ([address] 2002    right breast    Chronic pain     back, legs, hips, knees, neck    Colitis     Fatty liver 2019    Fibromyalgia 2019    Gastrointestinal disorder     lap band     GERD (gastroesophageal reflux disease)     Morbid obesity ([address]     Other ill-defined conditions(799.89)     loud snoring    Other ill-defined conditions(799.89) 2008    blood transfusion after hysterectomy mike lemus    Psychiatric disorder     bipolar, PTSD, ADD, depresssion    Unspecified adverse effect of anesthesia     intraop awareness    Unspecified adverse effect of anesthesia 2002    hot flashes post op after lumpectomy        Past Surgical History     Past Surgical History:   Procedure Laterality Date    APPENDECTOMY      age 16    BREAST SURGERY      11/7/02

## 2023-05-30 NOTE — CONSULTS
Pulmonology and Critical Care Consult    Subjective:   Consult Note: 5/29/2023 8:09 PM    Chief Complaint:   Chief Complaint   Patient presents with    Tingling        This patient has been seen and evaluated at the request of Dr. Nate Buckner. Patient is a 71-year-old  female with history of ADHD, PTSD, depression, fibromyalgia, GERD, and right breast cancer status post reported lumpectomy who presented to the hospital with left facial tingling and the pulmonary service was consulted for an abnormal chest x-ray. Patient seen and examined in her room on the floor this evening, states that her left facial paresthesias are roughly the same as yesterday. Of note, she underwent a CT head which was nonacute, CTA head and neck which was nonacute, and MRI brain which also was unremarkable. A TTE also showed an EF of 60 to 65%, no obvious diastolic dysfunction. Labs were also relatively unremarkable, CBC/BMP normal, troponin negative x2, INR 1.0, LFTs normal.  Psychiatry and neurology were both consulted, there is a concern for somatization. Also complaining of a right submandibular lymph node which is enlarged and swollen and painful, ENT has been consulted to assist with this. Her chest x-ray was personally reviewed and does show an irregular density overlying the right midlung. A CT scan of the chest was completed, the final radiology read is still pending. However, the lungs appear very clear on exam, there are metallic markers present within the right breast, which she states was placed there on purpose by the surgeon following the lumpectomy. There is nothing abnormal within the lungs per my read. She denies any shortness of breath at this time and is saturating well on room air. Nothing further to add from a pulmonary standpoint, the pulmonary service will sign off.       Past Medical History:   Diagnosis Date    ADHD (attention deficit hyperactivity disorder)     Arthritis     CAD (coronary artery

## 2023-05-31 ENCOUNTER — APPOINTMENT (OUTPATIENT)
Facility: HOSPITAL | Age: 53
End: 2023-05-31
Payer: MEDICARE

## 2023-05-31 VITALS
SYSTOLIC BLOOD PRESSURE: 116 MMHG | HEIGHT: 69 IN | WEIGHT: 195 LBS | HEART RATE: 61 BPM | RESPIRATION RATE: 18 BRPM | TEMPERATURE: 97.7 F | OXYGEN SATURATION: 96 % | BODY MASS INDEX: 28.88 KG/M2 | DIASTOLIC BLOOD PRESSURE: 78 MMHG

## 2023-05-31 PROCEDURE — G0378 HOSPITAL OBSERVATION PER HR: HCPCS

## 2023-05-31 PROCEDURE — 6370000000 HC RX 637 (ALT 250 FOR IP): Performed by: INTERNAL MEDICINE

## 2023-05-31 PROCEDURE — 2580000003 HC RX 258: Performed by: INTERNAL MEDICINE

## 2023-05-31 PROCEDURE — 76536 US EXAM OF HEAD AND NECK: CPT

## 2023-05-31 PROCEDURE — 6370000000 HC RX 637 (ALT 250 FOR IP): Performed by: HOSPITALIST

## 2023-05-31 PROCEDURE — 97161 PT EVAL LOW COMPLEX 20 MIN: CPT

## 2023-05-31 PROCEDURE — 97165 OT EVAL LOW COMPLEX 30 MIN: CPT

## 2023-05-31 PROCEDURE — 97530 THERAPEUTIC ACTIVITIES: CPT

## 2023-05-31 PROCEDURE — 2580000003 HC RX 258: Performed by: HOSPITALIST

## 2023-05-31 PROCEDURE — 6370000000 HC RX 637 (ALT 250 FOR IP): Performed by: PSYCHIATRY & NEUROLOGY

## 2023-05-31 RX ORDER — VENLAFAXINE 37.5 MG/1
37.5 TABLET ORAL DAILY
Status: DISCONTINUED | OUTPATIENT
Start: 2023-05-31 | End: 2023-05-31 | Stop reason: HOSPADM

## 2023-05-31 RX ORDER — OXCARBAZEPINE 300 MG/1
300 TABLET, FILM COATED ORAL 2 TIMES DAILY
Qty: 60 TABLET | Refills: 3 | Status: SHIPPED | OUTPATIENT
Start: 2023-05-31

## 2023-05-31 RX ORDER — OXCARBAZEPINE 150 MG/1
300 TABLET, FILM COATED ORAL 2 TIMES DAILY
Status: DISCONTINUED | OUTPATIENT
Start: 2023-05-31 | End: 2023-05-31 | Stop reason: HOSPADM

## 2023-05-31 RX ORDER — VENLAFAXINE 37.5 MG/1
37.5 TABLET ORAL DAILY
Qty: 90 TABLET | Refills: 3 | Status: SHIPPED | OUTPATIENT
Start: 2023-06-01

## 2023-05-31 RX ADMIN — DULOXETINE HYDROCHLORIDE 60 MG: 30 CAPSULE, DELAYED RELEASE ORAL at 09:01

## 2023-05-31 RX ADMIN — VENLAFAXINE 37.5 MG: 37.5 TABLET ORAL at 15:03

## 2023-05-31 RX ADMIN — SODIUM CHLORIDE: 9 INJECTION, SOLUTION INTRAVENOUS at 01:34

## 2023-05-31 RX ADMIN — ATORVASTATIN CALCIUM 40 MG: 40 TABLET, FILM COATED ORAL at 09:01

## 2023-05-31 RX ADMIN — PANTOPRAZOLE SODIUM 40 MG: 40 TABLET, DELAYED RELEASE ORAL at 09:01

## 2023-05-31 RX ADMIN — SODIUM CHLORIDE, PRESERVATIVE FREE 10 ML: 5 INJECTION INTRAVENOUS at 09:02

## 2023-05-31 RX ADMIN — ACYCLOVIR 800 MG: 800 TABLET ORAL at 09:01

## 2023-05-31 RX ADMIN — ACYCLOVIR 800 MG: 800 TABLET ORAL at 15:03

## 2023-05-31 RX ADMIN — OXCARBAZEPINE 300 MG: 150 TABLET, FILM COATED ORAL at 15:02

## 2023-05-31 NOTE — PROGRESS NOTES
Called, Dr. Candido Rahman to get follow up appointment for patient and the closest they had was July.
Medication cup with small round white pill found at pt bedside. Pt asked about medication and she replied \"oh that is my ativan from last night. I did not need it then so I held on to it for when I needed it. \" Explained to pt that we should not keep medications sitting out especially controlled substances and I would need to dispose of it and I could administer her one when she needed it. Pt became upset and grabbed cup taking pill. Clinical Care Lead notified of incident. Pt educated on discharge instructions and TIA and anxiety reviewed with written information given.
RDW 12.2 11.5 - 14.5 %    Platelets 013 397 - 469 K/uL    MPV 11.2 8.9 - 12.9 FL    Nucleated RBCs 0.0 0.0  WBC    nRBC 0.00 0.00 - 0.01 K/uL    Neutrophils % 36 32 - 75 %    Lymphocytes % 55 (H) 12 - 49 %    Monocytes % 6 5 - 13 %    Eosinophils % 2 0 - 7 %    Basophils % 1 0 - 1 %    Immature Granulocytes 0 0 - 0.5 %    Neutrophils Absolute 3.1 1.8 - 8.0 K/UL    Lymphocytes Absolute 4.7 (H) 0.8 - 3.5 K/UL    Monocytes Absolute 0.6 0.0 - 1.0 K/UL    Eosinophils Absolute 0.2 0.0 - 0.4 K/UL    Basophils Absolute 0.1 0.0 - 0.1 K/UL    Absolute Immature Granulocyte 0.0 0.00 - 0.04 K/UL    Differential Type AUTOMATED     Comprehensive Metabolic Panel    Collection Time: 05/28/23  9:41 PM   Result Value Ref Range    Sodium 140 136 - 145 mmol/L    Potassium 3.6 3.5 - 5.1 mmol/L    Chloride 109 (H) 97 - 108 mmol/L    CO2 25 21 - 32 mmol/L    Anion Gap 6 5 - 15 mmol/L    Glucose 113 (H) 65 - 100 mg/dL    BUN 13 6 - 20 mg/dL    Creatinine 0.80 0.55 - 1.02 mg/dL    Bun/Cre Ratio 16 12 - 20      Est, Glom Filt Rate >60 >60 ml/min/1.73m2    Calcium 9.6 8.5 - 10.1 mg/dL    Total Bilirubin 0.3 0.2 - 1.0 mg/dL    AST 18 15 - 37 U/L    ALT 28 12 - 78 U/L    Alk Phosphatase 122 (H) 45 - 117 U/L    Total Protein 7.2 6.4 - 8.2 g/dL    Albumin 3.5 3.5 - 5.0 g/dL    Globulin 3.7 2.0 - 4.0 g/dL    Albumin/Globulin Ratio 0.9 (L) 1.1 - 2.2     Troponin    Collection Time: 05/28/23  9:41 PM   Result Value Ref Range    Troponin, High Sensitivity 5 0 - 51 ng/L   EKG 12 Lead    Collection Time: 05/28/23  9:58 PM   Result Value Ref Range    Ventricular Rate 77 BPM    Atrial Rate 77 BPM    P-R Interval 146 ms    QRS Duration 90 ms    Q-T Interval 394 ms    QTc Calculation (Bazett) 445 ms    P Axis 30 degrees    R Axis 40 degrees    T Axis 43 degrees    Diagnosis       Normal sinus rhythm  Normal ECG  No previous ECGs available  Confirmed by Natalia Cristina MD, Tone Earl ([id number]) on 5/29/2023 7:33:32 AM     Protime-INR    Collection Time: 05/28/23 10:10

## 2023-05-31 NOTE — PLAN OF CARE
PHYSICAL THERAPY EVALUATION  Patient: Dodie Varela (69 y.o. female)  Date: 5/31/2023  Primary Diagnosis: Arm paresthesia, left [R20.2]  Numbness and tingling of left side of face [R20.0, R20.2]       Precautions: Bed Alarm                In place during session: Peripheral IV and Nasal Cannula 2L    GOALS:    Problem: Physical Therapy - Adult  Goal: By Discharge: Performs mobility at highest level of function for planned discharge setting. See evaluation for individualized goals. Description: FUNCTIONAL STATUS PRIOR TO ADMISSION: Patient was independent and active without use of DME.    HOME SUPPORT PRIOR TO ADMISSION: The patient lived alone with no local support. Physical Therapy Goals  Initiated 5/31/2023  Pt stated goal: To go home  Pt will be I with LE HEP in 7 days. Pt will perform bed mobility with Modified [place] in 7 days. Pt will perform transfers with Modified [place] in 7 days. Pt will amb 80 feet with LRAD safely with Modified [place] in 7 days. Pt will ascend/descend 4 steps with B handrail(s) and Contact Guard Assist in 7 days to safely enter/navigate home. Pt will demonstrate improvement in dynamic standing balance from fair to good in 7 days. Outcome: Progressing       ASSESSMENT  Pt is a 47 y.o. female admitted on 5/28/2023 for Left-sided facial paresthesia; pt currently being treated for TIA . Pt supine in bed upon PT arrival, agreeable to evaluation. Pt A&O x 4. Based on the objective data described below, the patient currently presents with impaired functional mobility, decreased activity tolerance, poor safety awareness, and impaired balance. (See below for objective details and assist levels). Overall pt tolerated session fairly well today with mild fatigue. Pt transferred SBA/CGA, ambulated in room CGA. Min cuing for safety and sequencing. Pt will benefit from continued skilled PT to address above deficits and return to PLOF.  Current PT DC
lower body clothing? []  1 []  2 [x]  3 []  4   2. Bathing (including washing, rinsing, drying)? []  1 []  2 [x]  3 []  4   3. Toileting, which includes using toilet, bedpan or urinal? [] 1 []  2 [x]  3 []  4   4. Putting on and taking off regular upper body clothing? []  1 []  2 [x]  3 []  4   5. Taking care of personal grooming such as brushing teeth? []  1 []  2 [x]  3 []  4   6. Eating meals? []  1 []  2 []  3 [x]  4   © , [address] [address], under license to [employer]. All rights reserved     Score:      Interpretation of Tool:  Represents clinically-significant functional categories (i.e. Activities of daily living). Percentage of Impairment CH    0%   CI    1-19% CJ    20-39% CK    40-59% CL    60-79% CM    80-99% CN     100%   [facility]  Score 6-24 24 23 20-22 15-19 10-14 7-9 6     Occupational Therapy Evaluation Charge Determination   History Examination Decision-Making   LOW Complexity : Brief history review  LOW Complexity: 1-3 Performance deficits relating to physical, cognitive, or psychosocial skills that result in activity limitations and/or participation restrictions MEDIUM Complexity: Patient may present with comorbidities that affect occupational performance.  Minimal to moderate modifications of tasks or assist (eg. physical or verbal) with assist is necessary to enable pt to complete eval      Based on the above components, the patient evaluation is determined to be of the following complexity level: Low      Pain Ratin/10       Activity Tolerance:   Fair  and requires rest breaks    After treatment patient left in no apparent distress:    Patient left in no apparent distress sitting up in chair and Call bell within reach, bed locked and in lowest position    COMMUNICATION/EDUCATION:   The patients plan of care was discussed with: Physical therapist and Registered nurse        Patient Education  Education Given To: Patient  Education Provided: Plan of Care;Role of

## 2023-05-31 NOTE — CARE COORDINATION
DC Plan: Home    CM met with pt at the bedside to see if she was interested in home health for PT/OT. Pt declined home health services. CM messaged attending via [employer]. CM spoke with Psych MD. Pt is cleared to go. Transition of Care Plan:    RUR: N/A  Prior Level of Functioning: independent  Disposition: Home  If SNF or IPR: Date FOC offered: N/A  Date FOC received: N/A  Accepting facility: N/A  Date authorization started with reference number: N/A  Date authorization received and expires: N/A  Follow up appointments: Yes  DME needed: None  Transportation at discharge:   IM/IMM Medicare/ letter given: No - OBS  Is patient a  and connected with [facility]? If yes, was [employer] transfer form completed and [facility] notified? N/A  Caregiver Contact: N/A  Discharge Caregiver contacted prior to discharge? N/A  Care Conference needed?  No  Barriers to discharge: None
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
## 2023-05-31 NOTE — DISCHARGE SUMMARY
Hospitalist Discharge Summary     Patient ID:  Giovani Seaman  [id number]  47 y.o.  1970 5/28/2023    PCP on record: MARCIA Bolden NP    Admit date: 5/28/2023  Discharge date and time: 5/31/2023    DISCHARGE DIAGNOSIS:    Left-sided facial paresthesia/transient ischemic attack/anxiety/depression/chronic tobacco use/concerns for somatization disorder/right neck lymph node swelling/gastroesophageal reflux disease    CONSULTATIONS:  IP CONSULT TO PSYCHIATRY  IP CONSULT TO PULMONOLOGY  IP CONSULT TO OTOLARYNGOLOGY    Excerpted HPI from H&P of Barry Martin MD:  Giovani Seaman is  47 y.o. female with history of multiple medical problems as mentioned below in past medical history presents to the emergency room complaining of left-sided facial tingling. States it started on the forehead and did spread over to the left side of the face. She states it is just the left half of the face. Denies any vision disturbance, headache. She complains of a jaw problem 3 days ago, after that today she noticed a small swelling in the right mandibular angle. She denies any local tenderness and just worried what it is.    ______________________________________________________________________  DISCHARGE SUMMARY/HOSPITAL COURSE:  for full details see H&P, daily progress notes, labs, consult notes.      Patient was subsequently admitted to [facility] further evaluation as well as management, patient was evaluated by neurology, remain on continuous telemetry monitoring, underwent CT head, underwent MRI brain, patient was evaluated by pulmonology, overall patient's clinical status improved, MRI was negative for cerebrovascular accident, following which patient was deemed stable for discharge with close outpatient follow-up with primary care physician as well as pulmonology as well as neurology as well as
psychiatry. _______________________________________________________________________  Patient seen and examined by me on discharge day. Pertinent Findings:  Gen:    Not in distress  Chest: Clear lungs  CVS:   Regular rhythm, s1/s2 no m/r/g  No edema  Abd:  Soft, not distended, not tender  Neuro:  Alert, Oriented x 4  _______________________________________________________________________  DISCHARGE MEDICATIONS:      Medication List        START taking these medications      acyclovir 800 MG tablet  Commonly known as: ZOVIRAX  Take 1 tablet by mouth 3 times daily for 10 days            CONTINUE taking these medications      atorvastatin 40 MG tablet  Commonly known as: LIPITOR     diclofenac 75 MG EC tablet  Commonly known as: VOLTAREN     DULoxetine 60 MG extended release capsule  Commonly known as: CYMBALTA     LORazepam 0.5 MG tablet  Commonly known as: ATIVAN     pantoprazole 40 MG tablet  Commonly known as: PROTONIX               Where to Get Your Medications        These medications were sent to [address] [place] [address] [phone] Chris Dear [phone]  [address], [address] [address]      Phone: [phone]   acyclovir 800 MG tablet           Patient Follow Up Instructions: Activity: activity as tolerated  Diet: cardiac diet  Wound Care: as directed    Follow-up with PCP/Neurology/Psychiatry/ENT/Pulmonology in 2 weeks. Follow-up tests/labs As per above physicians  Follow-up Information       Follow up With Specialties Details Why [address], APRN - NP Family Medicine Follow up in 2 week(s)  [address] [address] [phone]      MARCIA Casanova NP Family Medicine   [address] [place] [address] [address]  [phone]      RODRIGUEZ Monroe MD Neurology Follow up in 1 week(s)  [facility]      Aniyah Myers MD Otolaryngology Follow up

## 2023-05-31 NOTE — DISCHARGE INSTR - COC
Continuity of Care Form    Patient Name: Sobia Muñoz   :  1970  MRN:  [id number]    Admit date:  2023  Discharge date:  May 31 2023    Code Status Order: Full Code   Advance Directives:     Admitting Physician:  Jennyfer Mistry MD  PCP: MARCIA Greene NP    Discharging Nurse: Mathew Tenorio Unit/Room#: 581/01  Discharging Unit Phone Number: [phone]    Emergency Contact:   Extended Emergency Contact Information  Primary Emergency Contact: [address] [place] [address] Phone: [phone]  Relation: Child    Past Surgical History:  Past Surgical History:   Procedure Laterality Date    APPENDECTOMY      age 16    BREAST SURGERY      02 lumpectomy right breast    CHOLECYSTECTOMY      DILATION AND CURETTAGE OF UTERUS      GI      lab band removed    GYN      hysteroscopy    HYSTERECTOMY (CERVIX STATUS UNKNOWN)          LAP, PLACE ADJUST GASTR BAND      ORTHOPEDIC SURGERY      OTHER SURGICAL HISTORY      multiple laparoscopy surgeries    OTHER SURGICAL HISTORY      cone biopsy    OTHER SURGICAL HISTORY      AnteriorCervicalDiscectomyFusion surgery    OTHER SURGICAL HISTORY      colonoscopy with polyp removal    UROLOGICAL SURGERY      bladder tack       Immunization History:   Immunization History   Administered Date(s) Administered    Influenza Virus Vaccine 2011    Influenza, FLUARIX, FLULAVAL, Sharion Cordia (age 10 mo+) AND AFLURIA, (age 1 y+), PF, 0.5mL 10/30/2018       Active Problems:  Patient Active Problem List   Diagnosis Code    Vitamin D deficiency E55.9    Essential hypertension I10    Other fatigue R53.83    Chronic back pain M54.9, G89.29    Sciatica M54.30    Hypercholesterolemia E78.00    Nausea & vomiting R11.2    Abdominal pain, LUQ (left upper quadrant) R10.12    DDD (degenerative disc disease), lumbar M51.36    Depression F32. A    Numbness and tingling of left side of face R20.0, R20.2       Isolation/Infection:   Isolation            No

## 2023-07-10 ENCOUNTER — TELEPHONE (OUTPATIENT)
Age: 53
End: 2023-07-10

## 2023-07-10 NOTE — TELEPHONE ENCOUNTER
Patient called and states that she needs the results of the ultrasound done in the hospital in May. She states that she wants to make sure her cancer did not come back. She states that no one told her the results.  Please call her at [phone]

## 2023-07-11 NOTE — TELEPHONE ENCOUNTER
Called and spoke to patient. Yissel Magallanes)   Per patient: she now has painful swelling under jaw bone and tongue. Patient states understanding to follow up with ENT Dr Whitley Casanova.      Patient states she will contact Dr Savannah Smyth office with any questions and confirm Appointment scheduled 7/18/23 at    Dr Savannah Smyth office. [phone].

## 2023-07-18 ENCOUNTER — OFFICE VISIT (OUTPATIENT)
Age: 53
End: 2023-07-18
Payer: MEDICARE

## 2023-07-18 VITALS
HEART RATE: 82 BPM | RESPIRATION RATE: 17 BRPM | WEIGHT: 190 LBS | OXYGEN SATURATION: 98 % | BODY MASS INDEX: 28.14 KG/M2 | HEIGHT: 69 IN | SYSTOLIC BLOOD PRESSURE: 124 MMHG | DIASTOLIC BLOOD PRESSURE: 80 MMHG

## 2023-07-18 DIAGNOSIS — H60.543 ACUTE ECZEMATOID OTITIS EXTERNA, BILATERAL: ICD-10-CM

## 2023-07-18 DIAGNOSIS — H93.13 TINNITUS OF BOTH EARS: ICD-10-CM

## 2023-07-18 DIAGNOSIS — K11.20 PAROTITIS: Primary | ICD-10-CM

## 2023-07-18 PROCEDURE — 3017F COLORECTAL CA SCREEN DOC REV: CPT | Performed by: OTOLARYNGOLOGY

## 2023-07-18 PROCEDURE — 4130F TOPICAL PREP RX AOE: CPT | Performed by: OTOLARYNGOLOGY

## 2023-07-18 PROCEDURE — 4004F PT TOBACCO SCREEN RCVD TLK: CPT | Performed by: OTOLARYNGOLOGY

## 2023-07-18 PROCEDURE — 3074F SYST BP LT 130 MM HG: CPT | Performed by: OTOLARYNGOLOGY

## 2023-07-18 PROCEDURE — G8419 CALC BMI OUT NRM PARAM NOF/U: HCPCS | Performed by: OTOLARYNGOLOGY

## 2023-07-18 PROCEDURE — 99204 OFFICE O/P NEW MOD 45 MIN: CPT | Performed by: OTOLARYNGOLOGY

## 2023-07-18 PROCEDURE — G8427 DOCREV CUR MEDS BY ELIG CLIN: HCPCS | Performed by: OTOLARYNGOLOGY

## 2023-07-18 PROCEDURE — 3079F DIAST BP 80-89 MM HG: CPT | Performed by: OTOLARYNGOLOGY

## 2023-07-18 RX ORDER — AMOXICILLIN AND CLAVULANATE POTASSIUM 875; 125 MG/1; MG/1
1 TABLET, FILM COATED ORAL 2 TIMES DAILY
Qty: 20 TABLET | Refills: 0 | Status: SHIPPED | OUTPATIENT
Start: 2023-07-18 | End: 2023-07-28

## 2023-07-18 ASSESSMENT — ENCOUNTER SYMPTOMS
ABDOMINAL PAIN: 0
STRIDOR: 0
NAUSEA: 0
COUGH: 0
SORE THROAT: 0
EYE ITCHING: 0
EYE DISCHARGE: 0
TROUBLE SWALLOWING: 0
SHORTNESS OF BREATH: 0
VOICE CHANGE: 0
VOMITING: 0
SINUS PAIN: 0
BACK PAIN: 0
SINUS PRESSURE: 0
CHOKING: 0
APNEA: 0
WHEEZING: 0
PHOTOPHOBIA: 0

## 2023-07-18 NOTE — PROGRESS NOTES
Subjective:    Jesenia Urbano   47 y.o.   1970     New Patient Visit    History of Present Illness:    7/18/23 - [address] [address]    46 yo female presented to ED with exhaustion, some head/facial paresthesias, she was dx with possible TIA, admitted to hospital.  Around same same she was having right sided facial swelling and jaw pain. She has had some occasional episodes of swelling/blisters in her mouth which she attributes to food allergies. She had US and CTA neck at [facility]. She has multiple somatic complaints, tinnitus, hx of vertigo, leg edema, tinnitus. Review of Systems  Review of Systems   Constitutional:  Negative for appetite change, chills, fatigue and fever. HENT:  Positive for mouth sores and tinnitus. Negative for congestion, ear discharge, ear pain, nosebleeds, postnasal drip, sinus pressure, sinus pain, sneezing, sore throat, trouble swallowing and voice change. Eyes:  Negative for photophobia, discharge, itching and visual disturbance. Respiratory:  Negative for apnea, cough, choking, shortness of breath, wheezing and stridor. Cardiovascular:  Negative for chest pain and palpitations. Gastrointestinal:  Negative for abdominal pain, nausea and vomiting. Endocrine: Negative for cold intolerance and heat intolerance. Genitourinary:  Negative for difficulty urinating and dysuria. Musculoskeletal:  Negative for arthralgias, back pain, gait problem, myalgias, neck pain and neck stiffness. Skin:  Negative for rash and wound. Allergic/Immunologic: Positive for food allergies. Negative for environmental allergies. Neurological:  Positive for dizziness and numbness. Negative for speech difficulty, light-headedness and headaches. Hematological:  Negative for adenopathy. Does not bruise/bleed easily. Psychiatric/Behavioral:  Negative for confusion and sleep disturbance. The patient is not nervous/anxious.           Past Medical History:   Diagnosis Date    ADHD (attention

## 2023-07-19 ENCOUNTER — TELEPHONE (OUTPATIENT)
Age: 53
End: 2023-07-19

## 2023-07-19 NOTE — TELEPHONE ENCOUNTER
We received a fax refill request for Joe Cole. Please escribe LORazepam (ATIVAN) 0.5 MG tablet to their pharmacy. The pharmacy is correct in the chart and they are requesting a 30 day supply.

## 2023-07-19 NOTE — TELEPHONE ENCOUNTER
We received a fax refill request for Ena Forrest. Please escribe DULoxetine (CYMBALTA) 60 MG extended release capsule to their pharmacy. The pharmacy is correct in the chart and they are requesting a 30 day supply. Pt has an apt with you on 8.24.23.

## 2023-07-20 RX ORDER — DULOXETIN HYDROCHLORIDE 60 MG/1
CAPSULE, DELAYED RELEASE ORAL
Qty: 30 CAPSULE | Refills: 5 | Status: SHIPPED | OUTPATIENT
Start: 2023-07-20

## 2023-07-20 NOTE — TELEPHONE ENCOUNTER
Sent in Cymbalta but can not fill ativan until her appt. Not seen in a year.  [facility]

## 2023-08-23 ENCOUNTER — TELEPHONE (OUTPATIENT)
Age: 53
End: 2023-08-23

## 2023-08-23 NOTE — TELEPHONE ENCOUNTER
----- [facility] at 8/23/2023  4:15 PM EDT -----  Subject: Refill Request    QUESTIONS  Name of Medication? LORazepam (ATIVAN) 0.5 MG tablet  Patient-reported dosage and instructions? 2-3 times as needed  How many days do you have left? 0  Preferred Pharmacy? [id number]  Pharmacy phone number (if available)? [phone]  Additional Information for Provider? pt had to r/s appt due to no   transportation  ---------------------------------------------------------------------------  --------------  CALL BACK INFO  What is the best way for the office to contact you? OK to leave message on   voicemail  Preferred Call Back Phone Number? [phone]  ---------------------------------------------------------------------------  --------------  SCRIPT ANSWERS  Relationship to Patient?  Self

## 2023-08-24 NOTE — TELEPHONE ENCOUNTER
She must be seen first. Will have to wait for appt. She has not been seen in over a year.  George Valencia

## 2023-09-08 ENCOUNTER — TELEPHONE (OUTPATIENT)
Age: 53
End: 2023-09-08

## 2023-09-08 NOTE — TELEPHONE ENCOUNTER
Attempted to call pt to get her scheduled for a HT and phone call with Dr Viri Sawyer for ht results and lvm

## 2023-11-06 ENCOUNTER — TELEPHONE (OUTPATIENT)
Age: 53
End: 2023-11-06

## 2023-11-06 NOTE — TELEPHONE ENCOUNTER
We received a fax refill request for Josefina Miller.  Please escribe Lorazepam to their pharmacy.  The pharmacy is correct in the chart and they are requesting a 90 day supply.

## 2023-11-15 ENCOUNTER — TELEPHONE (OUTPATIENT)
Age: 53
End: 2023-11-15

## 2023-11-29 ENCOUNTER — TELEPHONE (OUTPATIENT)
Age: 53
End: 2023-11-29

## 2024-01-15 ENCOUNTER — TELEPHONE (OUTPATIENT)
Age: 54
End: 2024-01-15

## 2024-01-15 ENCOUNTER — OFFICE VISIT (OUTPATIENT)
Age: 54
End: 2024-01-15
Payer: MEDICARE

## 2024-01-15 VITALS
DIASTOLIC BLOOD PRESSURE: 84 MMHG | HEART RATE: 68 BPM | OXYGEN SATURATION: 97 % | WEIGHT: 201 LBS | HEIGHT: 69 IN | SYSTOLIC BLOOD PRESSURE: 126 MMHG | TEMPERATURE: 98.8 F | BODY MASS INDEX: 29.77 KG/M2 | RESPIRATION RATE: 18 BRPM

## 2024-01-15 DIAGNOSIS — E55.9 VITAMIN D DEFICIENCY: ICD-10-CM

## 2024-01-15 DIAGNOSIS — K21.00 GASTROESOPHAGEAL REFLUX DISEASE WITH ESOPHAGITIS WITHOUT HEMORRHAGE: ICD-10-CM

## 2024-01-15 DIAGNOSIS — G40.89 OTHER SEIZURES (HCC): ICD-10-CM

## 2024-01-15 DIAGNOSIS — Z85.3 HISTORY OF RIGHT BREAST CANCER: ICD-10-CM

## 2024-01-15 DIAGNOSIS — F31.62 BIPOLAR DISORDER, CURRENT EPISODE MIXED, MODERATE (HCC): ICD-10-CM

## 2024-01-15 DIAGNOSIS — Z00.00 WELL EXAM WITHOUT ABNORMAL FINDINGS OF PATIENT 18 YEARS OF AGE OR OLDER: Primary | ICD-10-CM

## 2024-01-15 PROBLEM — R56.9 UNSPECIFIED CONVULSIONS (HCC): Status: ACTIVE | Noted: 2024-01-15

## 2024-01-15 PROCEDURE — 4004F PT TOBACCO SCREEN RCVD TLK: CPT | Performed by: NURSE PRACTITIONER

## 2024-01-15 PROCEDURE — 99214 OFFICE O/P EST MOD 30 MIN: CPT | Performed by: NURSE PRACTITIONER

## 2024-01-15 PROCEDURE — 3078F DIAST BP <80 MM HG: CPT | Performed by: NURSE PRACTITIONER

## 2024-01-15 PROCEDURE — G8428 CUR MEDS NOT DOCUMENT: HCPCS | Performed by: NURSE PRACTITIONER

## 2024-01-15 PROCEDURE — 3074F SYST BP LT 130 MM HG: CPT | Performed by: NURSE PRACTITIONER

## 2024-01-15 PROCEDURE — G8419 CALC BMI OUT NRM PARAM NOF/U: HCPCS | Performed by: NURSE PRACTITIONER

## 2024-01-15 PROCEDURE — 3017F COLORECTAL CA SCREEN DOC REV: CPT | Performed by: NURSE PRACTITIONER

## 2024-01-15 PROCEDURE — G0439 PPPS, SUBSEQ VISIT: HCPCS | Performed by: NURSE PRACTITIONER

## 2024-01-15 PROCEDURE — G8484 FLU IMMUNIZE NO ADMIN: HCPCS | Performed by: NURSE PRACTITIONER

## 2024-01-15 RX ORDER — OXCARBAZEPINE 300 MG/1
300 TABLET, FILM COATED ORAL 2 TIMES DAILY
Qty: 60 TABLET | Refills: 1 | Status: SHIPPED | OUTPATIENT
Start: 2024-01-15

## 2024-01-15 RX ORDER — DICLOFENAC SODIUM 75 MG/1
TABLET, DELAYED RELEASE ORAL
Qty: 60 TABLET | Refills: 1 | Status: SHIPPED | OUTPATIENT
Start: 2024-01-15

## 2024-01-15 RX ORDER — ACYCLOVIR 400 MG/1
400 TABLET ORAL
Qty: 35 TABLET | Refills: 1 | Status: SHIPPED | OUTPATIENT
Start: 2024-01-15 | End: 2024-01-22

## 2024-01-15 RX ORDER — LORAZEPAM 0.5 MG/1
0.5 TABLET ORAL 2 TIMES DAILY PRN
Qty: 30 TABLET | Refills: 1 | Status: SHIPPED | OUTPATIENT
Start: 2024-01-15 | End: 2024-02-14

## 2024-01-15 RX ORDER — VENLAFAXINE 37.5 MG/1
37.5 TABLET ORAL DAILY
Qty: 90 TABLET | Refills: 3 | Status: SHIPPED | OUTPATIENT
Start: 2024-01-15

## 2024-01-15 RX ORDER — ACYCLOVIR 400 MG/1
400 TABLET ORAL 3 TIMES DAILY
Qty: 30 TABLET | Refills: 1 | Status: SHIPPED | OUTPATIENT
Start: 2024-01-15 | End: 2024-01-15 | Stop reason: SDUPTHER

## 2024-01-15 RX ORDER — PANTOPRAZOLE SODIUM 40 MG/1
40 TABLET, DELAYED RELEASE ORAL DAILY
Qty: 30 TABLET | Refills: 3 | Status: SHIPPED | OUTPATIENT
Start: 2024-01-15

## 2024-01-15 SDOH — ECONOMIC STABILITY: HOUSING INSECURITY
IN THE LAST 12 MONTHS, WAS THERE A TIME WHEN YOU DID NOT HAVE A STEADY PLACE TO SLEEP OR SLEPT IN A SHELTER (INCLUDING NOW)?: NO

## 2024-01-15 SDOH — ECONOMIC STABILITY: INCOME INSECURITY: HOW HARD IS IT FOR YOU TO PAY FOR THE VERY BASICS LIKE FOOD, HOUSING, MEDICAL CARE, AND HEATING?: NOT VERY HARD

## 2024-01-15 SDOH — ECONOMIC STABILITY: FOOD INSECURITY: WITHIN THE PAST 12 MONTHS, THE FOOD YOU BOUGHT JUST DIDN'T LAST AND YOU DIDN'T HAVE MONEY TO GET MORE.: NEVER TRUE

## 2024-01-15 SDOH — ECONOMIC STABILITY: FOOD INSECURITY: WITHIN THE PAST 12 MONTHS, YOU WORRIED THAT YOUR FOOD WOULD RUN OUT BEFORE YOU GOT MONEY TO BUY MORE.: NEVER TRUE

## 2024-01-15 ASSESSMENT — PATIENT HEALTH QUESTIONNAIRE - PHQ9: DEPRESSION UNABLE TO ASSESS: PT REFUSES

## 2024-01-15 NOTE — PROGRESS NOTES
Medicare Annual Wellness Visit    Josefina Miller is here for Anxiety, Consultation, and Medicare AWV    Assessment & Plan   Well exam without abnormal findings of patient 18 years of age or older  -     TSH; Future  -     Lipid Panel; Future  -     Comprehensive Metabolic Panel; Future  -     CBC with Auto Differential; Future  History of right breast cancer  -     BS - Leonel Boone MD, Breast Surgery, [place]  Other seizures  Bipolar disorder, current episode mixed, moderate (HCC)  -     OXcarbazepine (TRILEPTAL) 300 MG tablet; Take 1 tablet by mouth 2 times daily, Disp-60 tablet, R-1Normal  -     venlafaxine (EFFEXOR) 37.5 MG tablet; Take 1 tablet by mouth daily, Disp-90 tablet, R-3Normal  -     LORazepam (ATIVAN) 0.5 MG tablet; Take 1 tablet by mouth 2 times daily as needed for Anxiety for up to 30 days. Max Daily Amount: 1 mg, Disp-30 tablet, R-1Normal  -     External Referral To Psychiatry  Vitamin D deficiency  -     Vitamin D 25 Hydroxy; Future  Gastroesophageal reflux disease with esophagitis without hemorrhage  -     External Referral To Gastroenterology    Refills updated today for psych but also must make appt with psychiatry to take over med management  Referral to breast surgery for eval and marker eval  Labs updated to include vitamins for fatigue  Explained fatigue is likely due to her poor mental health    Recommendations for Preventive Services Due: see orders and patient instructions/AVS.  Recommended screening schedule for the next 5-10 years is provided to the patient in written form: see Patient Instructions/AVS.     No follow-ups on file.     Subjective   The following acute and/or chronic problems were also addressed today:  1.Patient in office today for med check.  States ativan works well for anxiety attacks.  Have not had medication in a couple months.     2.Pt is not taking Lipitor.  Dx with TIA in may  Was dx at [facility].     3.Pt would like a referral for breast specialist.  Dx

## 2024-01-15 NOTE — PROGRESS NOTES
Chief Complaint   Patient presents with    Anxiety    Consultation         1.Patient in office today for med check.  States ativan works well for anxiety attacks.  Have not had medication in a couple months.    2.Pt is not taking Lipitor.  Dx with TIA in may  Was dx at [facility].    3.Pt would like a referral for breast specialist.  Dx with Breast Cancer in 2003-had radial implants placed.  Pt reported during mammogram pain was noted and have not improved since.    4.Pt needs a referral for gi specialist.  [facility]  Pt has hx of c.diff and polyps.        1. Have you been to the ER, urgent care clinic since your last visit?  Hospitalized since your last visit?No    2. Have you seen or consulted any other health care providers outside of the [facility] since your last visit?  Include any pap smears or colon screening. No

## 2024-01-22 RX ORDER — DULOXETIN HYDROCHLORIDE 60 MG/1
CAPSULE, DELAYED RELEASE ORAL
Qty: 90 CAPSULE | Refills: 3 | Status: SHIPPED | OUTPATIENT
Start: 2024-01-22

## 2024-03-28 DIAGNOSIS — F31.62 BIPOLAR DISORDER, CURRENT EPISODE MIXED, MODERATE (HCC): ICD-10-CM

## 2024-03-30 RX ORDER — OXCARBAZEPINE 300 MG/1
300 TABLET, FILM COATED ORAL 2 TIMES DAILY
Qty: 180 TABLET | Refills: 1 | Status: SHIPPED | OUTPATIENT
Start: 2024-03-30

## 2024-03-30 RX ORDER — OXCARBAZEPINE 300 MG/1
300 TABLET, FILM COATED ORAL 2 TIMES DAILY
Qty: 180 TABLET | OUTPATIENT
Start: 2024-03-30

## 2024-05-13 ENCOUNTER — OFFICE VISIT (OUTPATIENT)
Age: 54
End: 2024-05-13
Payer: MEDICARE

## 2024-05-13 ENCOUNTER — TELEPHONE (OUTPATIENT)
Age: 54
End: 2024-05-13

## 2024-05-13 VITALS — HEIGHT: 69 IN | BODY MASS INDEX: 29.21 KG/M2 | WEIGHT: 197.2 LBS

## 2024-05-13 DIAGNOSIS — Z85.3 HISTORY OF BREAST CANCER: Primary | ICD-10-CM

## 2024-05-13 PROCEDURE — 99203 OFFICE O/P NEW LOW 30 MIN: CPT | Performed by: SURGERY

## 2024-05-13 PROCEDURE — 4004F PT TOBACCO SCREEN RCVD TLK: CPT | Performed by: SURGERY

## 2024-05-13 PROCEDURE — G8427 DOCREV CUR MEDS BY ELIG CLIN: HCPCS | Performed by: SURGERY

## 2024-05-13 PROCEDURE — 76642 ULTRASOUND BREAST LIMITED: CPT | Performed by: SURGERY

## 2024-05-13 PROCEDURE — 3017F COLORECTAL CA SCREEN DOC REV: CPT | Performed by: SURGERY

## 2024-05-13 PROCEDURE — G8419 CALC BMI OUT NRM PARAM NOF/U: HCPCS | Performed by: SURGERY

## 2024-05-16 NOTE — PROGRESS NOTES
HISTORY OF PRESENT ILLNESS  Josefina Miller is a 54 y.o. female     HPI NEW patient referred by KAUSHIK Green to discuss surgery. Patient had a RIGHT lumpectomy in 2002. Patient reports the markers placed are causing pain and discomfort.     Family History:  Self - Breast Cancer - 2002  Mother - Lung Cancer - dx 82, passed  Brother - Lung/Stomach - dx unknown 13  Maternal Niece - Breast Cancer - dx unknown  Maternal Grandmother - Ovarian Cancer - dx at 93, passed    Breast Imaging:  Unknown date of last mammogram     Review of Systems      Physical Exam       ASSESSMENT and PLAN  {Assessment and Plan Chronic Disease:[id number]}    
Not my patient.  
Not my patient.  
normal.       Imaging & Procedures  BREAST ULTRASOUND  Indication: RIGHT breast nodules and tenderness   Technique: The area was scanned using a high-frequency linear-array near-field transducer  Findings: two nodules in lumpectomy scar.  Impression: fat necrosis/scar  Disposition: Schedule excisional biopsy.       ASSESSMENT and PLAN   Diagnosis Orders   1. History of breast cancer          New patient presents to discuss surgery, and is doing well overall. Pt has two nodules in her RIGHT lumpectomy scar, which are tender to touch. We will schedule her for an excisional biopsy.     This plan was reviewed with the patient and patient agrees. All questions were answered.    Total time spent excluding procedural time was 40  minutes.    Rosanne ZAPIEN, am scribing for and in the presence of Leonel Boone Jr, MD. 5/13/24/9:04 AM EDT    I, Dr. Boone, personally performed the services described in this document as scribed by Sheila Rueda in my presence, and it is both accurate and complete.

## 2024-05-20 ENCOUNTER — PREP FOR PROCEDURE (OUTPATIENT)
Age: 54
End: 2024-05-20

## 2024-05-20 DIAGNOSIS — Z85.3 HISTORY OF BREAST CANCER IN FEMALE: Primary | ICD-10-CM

## 2024-05-20 DIAGNOSIS — Z85.3 HISTORY OF RIGHT BREAST CANCER: ICD-10-CM

## 2024-07-01 ENCOUNTER — HOSPITAL ENCOUNTER (OUTPATIENT)
Facility: HOSPITAL | Age: 54
Discharge: HOME | End: 2024-07-04
Payer: MEDICARE

## 2024-07-01 VITALS
WEIGHT: 204 LBS | HEIGHT: 69 IN | HEART RATE: 71 BPM | BODY MASS INDEX: 30.21 KG/M2 | DIASTOLIC BLOOD PRESSURE: 83 MMHG | OXYGEN SATURATION: 98 % | RESPIRATION RATE: 20 BRPM | SYSTOLIC BLOOD PRESSURE: 116 MMHG | TEMPERATURE: 97.7 F

## 2024-07-01 PROCEDURE — 93005 ELECTROCARDIOGRAM TRACING: CPT | Performed by: NURSE PRACTITIONER

## 2024-07-01 RX ORDER — PANTOPRAZOLE SODIUM 40 MG/1
40 TABLET, DELAYED RELEASE ORAL DAILY PRN
COMMUNITY

## 2024-07-01 RX ORDER — OXCARBAZEPINE 300 MG/1
300 TABLET, FILM COATED ORAL EVERY EVENING
COMMUNITY

## 2024-07-01 NOTE — DISCHARGE INSTRUCTIONS
surgery.  Do not shave or trim anywhere 24 hours before surgery  Wear your hair loose or down; no pony-tails, buns, or metal hair clips  Wear loose, comfortable, clean clothes  Wear glasses instead of contacts. Bring a case to keep your glasses safe.  Leave money, valuables, and jewelry, including body piercings, at home      Going Home - or Spending the Night SAME-DAY SURGERY: You must have a responsible adult drive you home and stay with you 24 hours after surgery. You may not drive for 24 hours after surgery.      Special Instructions Free  parking available from 7 AM until 5 PM.         Follow all instructions so your surgery won’t be cancelled.  Please, be on time.                     If a situation occurs and you are delayed the day of surgery, call [phone].     If your physical condition changes (like a fever, cold, flu, etc.) call your surgeon.     Home medication(s) reviewed and verified via LIST and VERBAL   during PAT appointment.

## 2024-07-02 ENCOUNTER — TELEPHONE (OUTPATIENT)
Age: 54
End: 2024-07-02

## 2024-07-02 LAB
EKG ATRIAL RATE: 66 BPM
EKG DIAGNOSIS: NORMAL
EKG P AXIS: 34 DEGREES
EKG P-R INTERVAL: 160 MS
EKG Q-T INTERVAL: 412 MS
EKG QRS DURATION: 82 MS
EKG QTC CALCULATION (BAZETT): 431 MS
EKG R AXIS: 29 DEGREES
EKG T AXIS: 41 DEGREES
EKG VENTRICULAR RATE: 66 BPM

## 2024-07-02 NOTE — TELEPHONE ENCOUNTER
Information given by the pre op nurse concerning transportation and post op care was given  to our office . Will reach out to the patient.

## 2024-07-02 NOTE — TELEPHONE ENCOUNTER
Norberto Ryder  Oncology Social Work Encounter    [] Med-Onc [facility] [] Med-Onc [facility] [] Med-Onc [facility] [] Rad-Onc [facility] [] Rad-Onc [facility] [] Rad-Onc [facility] [] Rad-Onc [facility] [x] Breast Center    Patient: Josefina Miller    Encounter Type:    [] Initial SW Encounter  [] Patient Initiated  [x] Referral  [] Distress/PHQ Screening  [] Other:      Concern(s)/Barrier(s) to Care: Transportation    Narrative:   Referral rec'd from team as we were notified by PAT that pt does not have transportation for the day of her surgery (7/16). Called the pt to talk about this further and explore transportation options. Pt was unavailable, SW left voicemail requesting a return phone call when she is available.     Referral/Handouts:           Plan:   Provide ongoing psychosocial support as desired by the patient.   SW remains available for further support and assistance.

## 2024-07-09 ENCOUNTER — TELEPHONE (OUTPATIENT)
Age: 54
End: 2024-07-09

## 2024-07-09 NOTE — TELEPHONE ENCOUNTER
Jaycee Bermudez M.D.  [phone]            History and Physical       NAME:  Geovanny Silvestre   :   1969   MRN:   [id number]       Referring Physician:  Catherine Min MD      Consult Date: 10/2/2020 12:28 PM    Chief Complaint:  Ulcerative proctitis    History of Present Illness:  Patient is a 49 y.o. who is seen for UP. Denies any ongoing GI complaints. PMH:  Past Medical History:   Diagnosis Date    Anal fissure     Anisocoria     Asthma     LAST EPISODE     Back pain     Cerumen impaction     Chronic kidney disease     hx uti in past    Coagulation defects     ocassional rectal bleeding due to anal fissure    Colovaginal fistula     Diabetes (HCC)     NIDDM    Diabetes ([facility] [address]     Diverticulitis     Diverticulosis     Enlarged tonsils     Frequent UTI     GERD (gastroesophageal reflux disease)     H/O endoscopy     with dilation    HA (headache)     Hepatic steatosis     Hx of colonoscopy with polypectomy     benign    Hypertension     Ill-defined condition     FREQUENT HIVES    Ill-defined condition     HX ELEVATED LIVER ENZYMES    Morbid obesity (HCC)     Nausea & vomiting     during diverticulitis flare    Obesity     Otitis media     Pneumonia     about 15 yrs ago    Psychiatric disorder     ANXIETY    Recurrent tonsillitis     Sinusitis     Transfusion history ~ age 36    postop hysterectomy    Unspecified sleep apnea     snores ( not diagnosed yet)     Urticaria     Urticaria        PSH:  Past Surgical History:   Procedure Laterality Date    ABDOMEN SURGERY PROC UNLISTED  2018    hernia repair at [facility]    COLONOSCOPY N/A 3/28/2019    COLONOSCOPY performed by Donte Malone MD at [address] [place] [address],[address]    blake.     HX GI  12    LAPAROSCOPIC HAND ASSISTED  POSS OPEN SIGMOID COLECTOMY POSS TEMPORARY DIVERTING LOOP ILEOSTOMY;  (no illeostomy needed)    HX GYN           HX Norberto Ryder  Oncology Social Work Encounter    [] Med-Onc [facility] [] Med-Onc [facility] [] Med-Onc [facility] [] Rad-Onc [facility] [] Rad-Onc [facility] [] Rad-Onc [facility] [] Rad[facility]Onc [facility] [x] Breast Center    Patient: Josefina Millre    Encounter Type:    [] Initial SW Encounter  [] Patient Initiated  [x] Referral  [] Distress/PHQ Screening  [] Other:      Concern(s)/Barrier(s) to Care: Transportation    Narrative:   Called the patient this morning to follow-up and explore transportation options for her surgery next Tuesday, 7/16. Pt stated that she does not have family members or friends available to assist with transportation. Pt is not a part of a [employer] community. SW explained that hospital staff prefers for patients to have someone accompany them during their surgery so that they can provide discharge instructions and drive pt home after pt receives general anesthesia. Pt was upset with this, stating that she has had around 30 surgical procedures done and using [employer] has never been a problem.     Called [facility] to discuss the patient's situation. PAT nurse will be calling SW when available to follow up.     SW emailed the patient a list of transportation options and called her back to discuss [employer]. The organization asks for a $60 fee to cover round trip services. They would provide transportation for the patient to surgery, act as the responsible party, be present during surgery, and be responsible for returning the patient home. Patient stated that she has a limited/fixed income and that this service would not be affordable for her at the moment. Pt became very upset with this writer and raised her voice stating that this situation is \"belittling\" her, saying that she is capable of driving herself and is more than aware to not drive herself home until she is able. Tried to explain that SW was attempting to help the patient connect with support resources to accommodate her needs for surgery after getting general  GYN      cervical conization    HX HEENT      SINUS SURGERY LEFT X2    HX HEENT      SINUS SURGERY ON RIGHT X2    HX OTHER SURGICAL  11/12    Sphincterotomy    HX PELVIC LAPAROSCOPY      HX EMMANUEL AND BSO      HX UROLOGICAL  7/31/12     CYSTOSCOPY INSERTION URETERAL CATHETERS - Cystoscopy Insertion of bilateral ureteral stents       Allergies: Allergies   Allergen Reactions    Aspirin Hives and Shortness of Breath    Codeine Hives, Itching and Angioedema     Pt had itching in mouth, on face and lips    Contrast Agent [Iodine] Hives, Itching and Angioedema     Pt. had itching in mouth, on face and lips after IV contrast with the last exam.  Benadryl was given. OK if premedicated.  Flavoring Agent Anaphylaxis    Percocet [Oxycodone-Acetaminophen] Hives, Itching and Angioedema     Pt had itching in mouth, on face and lips    Prilosec [Omeprazole Magnesium] Anaphylaxis     CHERRY FLAVORED ODT; PT TAKES REGULAR PRILOSEC AND IS OK    Dilaudid [Hydromorphone] Itching     Tolerates with benadryl    Ketorolac Rash     \"makes my eyes spasm and causes rash on my hands\" and \"makes my skin itch and makes me nervous\"    Fentanyl Rash and Itching    Morphine Itching     Severe itching. Tolerates with Benadryl       Home Medications:  Cannot display prior to admission medications because the patient has not been admitted in this contact. Hospital Medications:  No current facility-administered medications for this encounter. Current Outpatient Medications   Medication Sig    promethazine (PHENERGAN) 25 mg tablet Take 1 Tab by mouth every six (6) hours as needed for Nausea.  estradioL (ESTRACE) 0.5 mg tablet TAKE 1 TABLET BY MOUTH ONCE DAILY    empagliflozin (Jardiance) 25 mg tablet Take 1 Tab by mouth daily.  ondansetron (Zofran ODT) 4 mg disintegrating tablet Take 1 Tab by mouth every eight (8) hours as needed for Nausea.     glimepiride (AMARYL) 4 mg tablet Take 1 Tab by mouth Daily (before breakfast).  cetirizine (ZYRTEC) 10 mg tablet Take 1 Tab by mouth daily.  hydrocortisone (ANUSOL-HC) 25 mg supp Insert 1 Suppository into rectum every twelve (12) hours.  nitroglycerin (RECTIV) 0.4 % (w/w) ointment Insert  into rectum every twelve (12) hours every twelve (12) hours.  ALPRAZolam (XANAX) 0.25 mg tablet Take 0.125-0.25 mg by mouth every twelve (12) hours as needed for Anxiety or Sleep.  melatonin tab tablet Take 5 mg by mouth nightly.  omeprazole (PRILOSEC) 20 mg capsule Take 40 mg by mouth Daily (before breakfast).  dicyclomine (BENTYL) 20 mg tablet Take 1 Tab by mouth every six (6) hours as needed (abdominal cramps) for up to 20 doses.  sertraline (ZOLOFT) 100 mg tablet Take 200 mg by mouth nightly.  losartan-hydroCHLOROthiazide (HYZAAR) 100-12.5 mg per tablet Take 1 Tab by mouth daily.  EPINEPHrine (EPIPEN) 0.3 mg/0.3 mL (1:1,000) injection 0.3 mL by IntraMUSCular route once as needed for up to 1 dose.  albuterol (PROVENTIL, VENTOLIN) 90 mcg/Actuation inhaler Take 2 Puffs by inhalation every six (6) hours as needed.  HYDROmorphone (DILAUDID) 2 mg tablet Take 1 Tab by mouth every four (4) hours as needed for Pain for up to 3 days. Max Daily Amount: 12 mg.    hyoscyamine SL (LEVSIN/SL) 0.125 mg SL tablet 1 Tab by SubLINGual route every four (4) hours as needed for Cramping.  ondansetron (Zofran ODT) 8 mg disintegrating tablet Take 1 Tab by mouth every eight (8) hours as needed for Nausea or Vomiting.        Social History:  Social History     Tobacco Use    Smoking status: Never Smoker    Smokeless tobacco: Never Used   Substance Use Topics    Alcohol use: Yes     Comment: Rarely       Family History:  Family History   Problem Relation Age of Onset    Diabetes Mother     Cancer Mother         NON-HODGKINS LYMPHOMA    Anesth Problems Mother         PONV    Diabetes Father     Heart Disease Father         CAD - STENTS, PACEMAKER    Arrhythmia Father Review of Systems:      Constitutional: negative fever, negative chills, negative weight loss  Eyes:   negative visual changes  ENT:   negative sore throat, tongue or lip swelling  Respiratory:  negative cough, negative dyspnea  Cards:  negative for chest pain, palpitations, lower extremity edema  GI:   See HPI  :  negative for frequency, dysuria  Integument:  negative for rash and pruritus  Heme:  negative for easy bruising and gum/nose bleeding  Musculoskel: negative for myalgias,  back pain and muscle weakness  Neuro: negative for headaches, dizziness, vertigo  Psych:  negative for feelings of anxiety, depression       Objective:   No data found. No intake/output data recorded. No intake/output data recorded. EXAM:     NEURO-a&o   HEENT-wnl   LUNGS-clear    COR-regular rate and rhythym     ABD-soft , no tenderness, no rebound, good bs     EXT-no edema     Data Review     Recent Labs     09/29/20  1536   WBC 7.0   HGB 11.9   HCT 36.4        Recent Labs     09/29/20  1536      K 3.2*      CO2 30   BUN 9   CREA 0.74   *   CA 9.2     Recent Labs     09/29/20  1536   AP 51   TP 6.9   ALB 3.5   GLOB 3.4   LPSE 134     No results for input(s): INR, PTP, APTT, INREXT in the last 72 hours. Patient Active Problem List   Diagnosis Code    Thrush B37.0    Postoperative complication U81. 9XXA    Acute respiratory failure ([facility] [address] J96.00    Hypertension I10    Steroid-induced diabetes ([facility] [address] E09.9, T38.0X5A    Diarrhea R19.7    Clostridium difficile diarrhea A04.72    Bronchitis J40    Accelerated hypertension I10    Type 2 diabetes mellitus (HCC) E11.9    Lactic acidosis E87.2    C. difficile colitis A04.72    C. difficile diarrhea A04.72    Mood disorder ([facility]) F39    UTI (urinary tract infection) N39.0    Proctitis K62.89      Assessment:   · Ulcerative proctitis   Plan:   · Colonoscopy today.      Signed By: oNrma Garrett MD     10/2/2020  12:28 PM

## 2024-07-16 ENCOUNTER — ANESTHESIA EVENT (OUTPATIENT)
Facility: HOSPITAL | Age: 54
End: 2024-07-16
Payer: MEDICARE

## 2024-07-16 ENCOUNTER — HOSPITAL ENCOUNTER (OUTPATIENT)
Facility: HOSPITAL | Age: 54
Setting detail: OUTPATIENT SURGERY
Discharge: HOME | End: 2024-07-16
Attending: SURGERY | Admitting: SURGERY
Payer: MEDICARE

## 2024-07-16 ENCOUNTER — ANESTHESIA (OUTPATIENT)
Facility: HOSPITAL | Age: 54
End: 2024-07-16
Payer: MEDICARE

## 2024-07-16 VITALS
HEIGHT: 69 IN | SYSTOLIC BLOOD PRESSURE: 116 MMHG | HEART RATE: 72 BPM | TEMPERATURE: 97.7 F | DIASTOLIC BLOOD PRESSURE: 74 MMHG | OXYGEN SATURATION: 91 % | RESPIRATION RATE: 12 BRPM | WEIGHT: 204.37 LBS | BODY MASS INDEX: 30.27 KG/M2

## 2024-07-16 DIAGNOSIS — Z85.3 HISTORY OF BREAST CANCER IN FEMALE: ICD-10-CM

## 2024-07-16 PROCEDURE — 3700000001 HC ADD 15 MINUTES (ANESTHESIA): Performed by: SURGERY

## 2024-07-16 PROCEDURE — 2500000003 HC RX 250 WO HCPCS: Performed by: NURSE ANESTHETIST, CERTIFIED REGISTERED

## 2024-07-16 PROCEDURE — 2500000003 HC RX 250 WO HCPCS: Performed by: SURGERY

## 2024-07-16 PROCEDURE — 88307 TISSUE EXAM BY PATHOLOGIST: CPT

## 2024-07-16 PROCEDURE — 3600000003 HC SURGERY LEVEL 3 BASE: Performed by: SURGERY

## 2024-07-16 PROCEDURE — 7100000001 HC PACU RECOVERY - ADDTL 15 MIN: Performed by: SURGERY

## 2024-07-16 PROCEDURE — 2709999900 HC NON-CHARGEABLE SUPPLY: Performed by: SURGERY

## 2024-07-16 PROCEDURE — 2580000003 HC RX 258: Performed by: ANESTHESIOLOGY

## 2024-07-16 PROCEDURE — 7100000010 HC PHASE II RECOVERY - FIRST 15 MIN: Performed by: SURGERY

## 2024-07-16 PROCEDURE — 3700000000 HC ANESTHESIA ATTENDED CARE: Performed by: SURGERY

## 2024-07-16 PROCEDURE — 3600000013 HC SURGERY LEVEL 3 ADDTL 15MIN: Performed by: SURGERY

## 2024-07-16 PROCEDURE — 7100000000 HC PACU RECOVERY - FIRST 15 MIN: Performed by: SURGERY

## 2024-07-16 PROCEDURE — 2500000003 HC RX 250 WO HCPCS: Performed by: ANESTHESIOLOGY

## 2024-07-16 PROCEDURE — 88311 DECALCIFY TISSUE: CPT

## 2024-07-16 PROCEDURE — 6360000002 HC RX W HCPCS: Performed by: NURSE ANESTHETIST, CERTIFIED REGISTERED

## 2024-07-16 PROCEDURE — 7100000011 HC PHASE II RECOVERY - ADDTL 15 MIN: Performed by: SURGERY

## 2024-07-16 RX ORDER — LIDOCAINE HYDROCHLORIDE AND EPINEPHRINE 10; 10 MG/ML; UG/ML
30 INJECTION, SOLUTION INFILTRATION; PERINEURAL ONCE
Status: DISCONTINUED | OUTPATIENT
Start: 2024-07-16 | End: 2024-07-16 | Stop reason: HOSPADM

## 2024-07-16 RX ORDER — ALBUTEROL SULFATE 2.5 MG/3ML
2.5 SOLUTION RESPIRATORY (INHALATION)
Status: DISCONTINUED | OUTPATIENT
Start: 2024-07-16 | End: 2024-07-16 | Stop reason: HOSPADM

## 2024-07-16 RX ORDER — ONDANSETRON 2 MG/ML
INJECTION INTRAMUSCULAR; INTRAVENOUS PRN
Status: DISCONTINUED | OUTPATIENT
Start: 2024-07-16 | End: 2024-07-16 | Stop reason: SDUPTHER

## 2024-07-16 RX ORDER — FENTANYL CITRATE 50 UG/ML
INJECTION, SOLUTION INTRAMUSCULAR; INTRAVENOUS PRN
Status: DISCONTINUED | OUTPATIENT
Start: 2024-07-16 | End: 2024-07-16 | Stop reason: SDUPTHER

## 2024-07-16 RX ORDER — DEXMEDETOMIDINE HYDROCHLORIDE 100 UG/ML
INJECTION, SOLUTION INTRAVENOUS PRN
Status: DISCONTINUED | OUTPATIENT
Start: 2024-07-16 | End: 2024-07-16 | Stop reason: SDUPTHER

## 2024-07-16 RX ORDER — DIPHENHYDRAMINE HYDROCHLORIDE 50 MG/ML
12.5 INJECTION INTRAMUSCULAR; INTRAVENOUS
Status: DISCONTINUED | OUTPATIENT
Start: 2024-07-16 | End: 2024-07-16 | Stop reason: HOSPADM

## 2024-07-16 RX ORDER — PROPOFOL 10 MG/ML
INJECTION, EMULSION INTRAVENOUS PRN
Status: DISCONTINUED | OUTPATIENT
Start: 2024-07-16 | End: 2024-07-16 | Stop reason: SDUPTHER

## 2024-07-16 RX ORDER — LABETALOL HYDROCHLORIDE 5 MG/ML
10 INJECTION, SOLUTION INTRAVENOUS
Status: DISCONTINUED | OUTPATIENT
Start: 2024-07-16 | End: 2024-07-16 | Stop reason: HOSPADM

## 2024-07-16 RX ORDER — OXYCODONE HYDROCHLORIDE AND ACETAMINOPHEN 5; 325 MG/1; MG/1
1 TABLET ORAL EVERY 4 HOURS PRN
Qty: 12 TABLET | Refills: 0 | Status: SHIPPED | OUTPATIENT
Start: 2024-07-16 | End: 2024-07-19

## 2024-07-16 RX ORDER — SODIUM CHLORIDE, SODIUM LACTATE, POTASSIUM CHLORIDE, CALCIUM CHLORIDE 600; 310; 30; 20 MG/100ML; MG/100ML; MG/100ML; MG/100ML
INJECTION, SOLUTION INTRAVENOUS CONTINUOUS
Status: DISCONTINUED | OUTPATIENT
Start: 2024-07-16 | End: 2024-07-16 | Stop reason: HOSPADM

## 2024-07-16 RX ORDER — BUPIVACAINE HYDROCHLORIDE 5 MG/ML
30 INJECTION, SOLUTION PERINEURAL ONCE
Status: DISCONTINUED | OUTPATIENT
Start: 2024-07-16 | End: 2024-07-16 | Stop reason: HOSPADM

## 2024-07-16 RX ORDER — ACETAMINOPHEN 325 MG/1
650 TABLET ORAL ONCE
Status: DISCONTINUED | OUTPATIENT
Start: 2024-07-16 | End: 2024-07-16 | Stop reason: HOSPADM

## 2024-07-16 RX ORDER — ROCURONIUM BROMIDE 10 MG/ML
INJECTION, SOLUTION INTRAVENOUS PRN
Status: DISCONTINUED | OUTPATIENT
Start: 2024-07-16 | End: 2024-07-16 | Stop reason: SDUPTHER

## 2024-07-16 RX ORDER — MIDAZOLAM HYDROCHLORIDE 1 MG/ML
INJECTION INTRAMUSCULAR; INTRAVENOUS PRN
Status: DISCONTINUED | OUTPATIENT
Start: 2024-07-16 | End: 2024-07-16 | Stop reason: SDUPTHER

## 2024-07-16 RX ORDER — HYDROMORPHONE HYDROCHLORIDE 1 MG/ML
1 INJECTION, SOLUTION INTRAMUSCULAR; INTRAVENOUS; SUBCUTANEOUS EVERY 5 MIN PRN
Status: DISCONTINUED | OUTPATIENT
Start: 2024-07-16 | End: 2024-07-16 | Stop reason: HOSPADM

## 2024-07-16 RX ORDER — LIDOCAINE HYDROCHLORIDE 20 MG/ML
INJECTION, SOLUTION EPIDURAL; INFILTRATION; INTRACAUDAL; PERINEURAL PRN
Status: DISCONTINUED | OUTPATIENT
Start: 2024-07-16 | End: 2024-07-16 | Stop reason: SDUPTHER

## 2024-07-16 RX ORDER — OXYCODONE HYDROCHLORIDE 5 MG/1
5 TABLET ORAL
Status: DISCONTINUED | OUTPATIENT
Start: 2024-07-16 | End: 2024-07-16 | Stop reason: HOSPADM

## 2024-07-16 RX ORDER — IPRATROPIUM BROMIDE AND ALBUTEROL SULFATE 2.5; .5 MG/3ML; MG/3ML
1 SOLUTION RESPIRATORY (INHALATION)
Status: DISCONTINUED | OUTPATIENT
Start: 2024-07-16 | End: 2024-07-16 | Stop reason: HOSPADM

## 2024-07-16 RX ORDER — DROPERIDOL 2.5 MG/ML
0.62 INJECTION, SOLUTION INTRAMUSCULAR; INTRAVENOUS
Status: DISCONTINUED | OUTPATIENT
Start: 2024-07-16 | End: 2024-07-16 | Stop reason: HOSPADM

## 2024-07-16 RX ORDER — DEXAMETHASONE SODIUM PHOSPHATE 4 MG/ML
INJECTION, SOLUTION INTRA-ARTICULAR; INTRALESIONAL; INTRAMUSCULAR; INTRAVENOUS; SOFT TISSUE PRN
Status: DISCONTINUED | OUTPATIENT
Start: 2024-07-16 | End: 2024-07-16 | Stop reason: SDUPTHER

## 2024-07-16 RX ORDER — NALOXONE HYDROCHLORIDE 0.4 MG/ML
INJECTION, SOLUTION INTRAMUSCULAR; INTRAVENOUS; SUBCUTANEOUS PRN
Status: DISCONTINUED | OUTPATIENT
Start: 2024-07-16 | End: 2024-07-16 | Stop reason: HOSPADM

## 2024-07-16 RX ORDER — GLYCOPYRROLATE 0.2 MG/ML
INJECTION INTRAMUSCULAR; INTRAVENOUS PRN
Status: DISCONTINUED | OUTPATIENT
Start: 2024-07-16 | End: 2024-07-16 | Stop reason: SDUPTHER

## 2024-07-16 RX ORDER — MEPERIDINE HYDROCHLORIDE 25 MG/ML
12.5 INJECTION INTRAMUSCULAR; INTRAVENOUS; SUBCUTANEOUS EVERY 5 MIN PRN
Status: DISCONTINUED | OUTPATIENT
Start: 2024-07-16 | End: 2024-07-16 | Stop reason: HOSPADM

## 2024-07-16 RX ORDER — LIDOCAINE HYDROCHLORIDE 10 MG/ML
1 INJECTION, SOLUTION EPIDURAL; INFILTRATION; INTRACAUDAL; PERINEURAL
Status: DISCONTINUED | OUTPATIENT
Start: 2024-07-16 | End: 2024-07-16 | Stop reason: HOSPADM

## 2024-07-16 RX ORDER — PHENYLEPHRINE HCL IN 0.9% NACL 0.4MG/10ML
SYRINGE (ML) INTRAVENOUS PRN
Status: DISCONTINUED | OUTPATIENT
Start: 2024-07-16 | End: 2024-07-16 | Stop reason: SDUPTHER

## 2024-07-16 RX ORDER — CEFAZOLIN SODIUM 1 G/3ML
INJECTION, POWDER, FOR SOLUTION INTRAMUSCULAR; INTRAVENOUS PRN
Status: DISCONTINUED | OUTPATIENT
Start: 2024-07-16 | End: 2024-07-16 | Stop reason: SDUPTHER

## 2024-07-16 RX ORDER — EPHEDRINE SULFATE/0.9% NACL/PF 25 MG/5 ML
SYRINGE (ML) INTRAVENOUS PRN
Status: DISCONTINUED | OUTPATIENT
Start: 2024-07-16 | End: 2024-07-16 | Stop reason: SDUPTHER

## 2024-07-16 RX ADMIN — MIDAZOLAM HYDROCHLORIDE 2 MG: 1 INJECTION, SOLUTION INTRAMUSCULAR; INTRAVENOUS at 09:29

## 2024-07-16 RX ADMIN — DEXMEDETOMIDINE 5 MCG: 100 INJECTION, SOLUTION INTRAVENOUS at 09:56

## 2024-07-16 RX ADMIN — DEXMEDETOMIDINE 5 MCG: 100 INJECTION, SOLUTION INTRAVENOUS at 10:13

## 2024-07-16 RX ADMIN — FENTANYL CITRATE 50 MCG: 50 INJECTION, SOLUTION INTRAMUSCULAR; INTRAVENOUS at 09:35

## 2024-07-16 RX ADMIN — GLYCOPYRROLATE 0.2 MG: 0.2 INJECTION INTRAMUSCULAR; INTRAVENOUS at 10:04

## 2024-07-16 RX ADMIN — EPHEDRINE SULFATE 5 MG: 5 INJECTION INTRAVENOUS at 10:13

## 2024-07-16 RX ADMIN — SODIUM CHLORIDE, POTASSIUM CHLORIDE, SODIUM LACTATE AND CALCIUM CHLORIDE: 600; 310; 30; 20 INJECTION, SOLUTION INTRAVENOUS at 09:29

## 2024-07-16 RX ADMIN — LIDOCAINE HYDROCHLORIDE 100 MG: 20 INJECTION, SOLUTION EPIDURAL; INFILTRATION; INTRACAUDAL; PERINEURAL at 09:37

## 2024-07-16 RX ADMIN — ROCURONIUM BROMIDE 50 MG: 10 INJECTION, SOLUTION INTRAVENOUS at 09:37

## 2024-07-16 RX ADMIN — EPHEDRINE SULFATE 5 MG: 5 INJECTION INTRAVENOUS at 10:18

## 2024-07-16 RX ADMIN — CEFAZOLIN 2 G: 1 INJECTION, POWDER, FOR SOLUTION INTRAMUSCULAR; INTRAVENOUS at 09:57

## 2024-07-16 RX ADMIN — DEXMEDETOMIDINE 5 MCG: 100 INJECTION, SOLUTION INTRAVENOUS at 10:23

## 2024-07-16 RX ADMIN — LIDOCAINE HYDROCHLORIDE 100 MG: 20 INJECTION, SOLUTION EPIDURAL; INFILTRATION; INTRACAUDAL; PERINEURAL at 10:23

## 2024-07-16 RX ADMIN — Medication 100 MCG: at 10:17

## 2024-07-16 RX ADMIN — FENTANYL CITRATE 50 MCG: 50 INJECTION, SOLUTION INTRAMUSCULAR; INTRAVENOUS at 09:29

## 2024-07-16 RX ADMIN — ONDANSETRON 4 MG: 2 INJECTION INTRAMUSCULAR; INTRAVENOUS at 09:46

## 2024-07-16 RX ADMIN — EPHEDRINE SULFATE 5 MG: 5 INJECTION INTRAVENOUS at 10:09

## 2024-07-16 RX ADMIN — HYDROMORPHONE HYDROCHLORIDE 1 MG: 1 INJECTION, SOLUTION INTRAMUSCULAR; INTRAVENOUS; SUBCUTANEOUS at 11:04

## 2024-07-16 RX ADMIN — PROPOFOL 150 MG: 10 INJECTION, EMULSION INTRAVENOUS at 09:37

## 2024-07-16 RX ADMIN — DEXMEDETOMIDINE 5 MCG: 100 INJECTION, SOLUTION INTRAVENOUS at 09:59

## 2024-07-16 RX ADMIN — SUGAMMADEX 200 MG: 100 INJECTION, SOLUTION INTRAVENOUS at 10:19

## 2024-07-16 RX ADMIN — DEXAMETHASONE SODIUM PHOSPHATE 4 MG: 4 INJECTION INTRA-ARTICULAR; INTRALESIONAL; INTRAMUSCULAR; INTRAVENOUS; SOFT TISSUE at 09:46

## 2024-07-16 ASSESSMENT — PAIN DESCRIPTION - ORIENTATION
ORIENTATION: RIGHT
ORIENTATION: RIGHT

## 2024-07-16 ASSESSMENT — PAIN SCALES - GENERAL
PAINLEVEL_OUTOF10: 8
PAINLEVEL_OUTOF10: 3

## 2024-07-16 ASSESSMENT — PAIN DESCRIPTION - DESCRIPTORS
DESCRIPTORS: ACHING
DESCRIPTORS: ACHING

## 2024-07-16 ASSESSMENT — PAIN DESCRIPTION - LOCATION
LOCATION: BACK
LOCATION: BREAST

## 2024-07-16 ASSESSMENT — PAIN - FUNCTIONAL ASSESSMENT: PAIN_FUNCTIONAL_ASSESSMENT: 0-10

## 2024-07-16 NOTE — ANESTHESIA POSTPROCEDURE EVALUATION
Department of Anesthesiology  Postprocedure Note    Patient: Josefina Miller  MRN: [id number]  YOB: 1970  Date of evaluation: 7/16/2024    Procedure Summary       Date: 07/16/24 Room / Location: [facility] ASU OR  / [facility] AMBULATORY OR    Anesthesia Start: 0929 Anesthesia Stop: 1035    Procedure: RIGHT BREAST EXCISIONAL BIOPSY (Right: Breast) Diagnosis:       History of right breast cancer      (History of right breast cancer [Z85.3])    Surgeons: Leonel Boone Jr, MD Responsible Provider: Laz Cheema MD    Anesthesia Type: general ASA Status: 2            Anesthesia Type: No value filed.    Dino Phase I: Dino Score: 8    Dino Phase II:      Anesthesia Post Evaluation    Patient location during evaluation: PACU  Patient participation: complete - patient participated  Level of consciousness: awake  Pain score: 0  Airway patency: patent  Nausea & Vomiting: no nausea and no vomiting  Cardiovascular status: blood pressure returned to baseline  Respiratory status: acceptable  Hydration status: euvolemic  Multimodal analgesia pain management approach  Pain management: adequate    No notable events documented.

## 2024-07-16 NOTE — ANESTHESIA PRE PROCEDURE
Department of Anesthesiology  Preprocedure Note       Name:  Josefina Miller   Age:  54 y.o.  :  1970                                          MRN:  [id number]         Date:  2024      Surgeon: Surgeon(s):  Leonel Boone Jr, MD    Procedure: Procedure(s):  RIGHT BREAST EXCISIONAL BIOPSY    Medications prior to admission:   Prior to Admission medications    Medication Sig Start Date End Date Taking? Authorizing Provider   pantoprazole (PROTONIX) 40 MG tablet Take 1 tablet by mouth daily as needed    ProviderSoumya MD   NONFORMULARY Take by mouth every morning Multivitamin  Vitamin B12  Vitamin D3  Mushroom Complex gummies    ProviderSoumya MD   OXcarbazepine (TRILEPTAL) 300 MG tablet Take 1 tablet by mouth every evening    ProviderSoumya MD   venlafaxine (EFFEXOR) 37.5 MG tablet Take 1 tablet by mouth daily 1/15/24   Nadya Gordillo APRN - NP   diclofenac (VOLTAREN) 75 MG EC tablet TAKE 1 TABLET BY MOUTH TWICE DAILY AS NEEDED FOR JOINT PAIN 1/15/24   Nadya Gordillo APRN - NP       Current medications:    Current Facility-Administered Medications   Medication Dose Route Frequency Provider Last Rate Last Admin    lidocaine PF 1 % injection 1 mL  1 mL IntraDERmal Once PRN Leonel Westbrook DO        acetaminophen (TYLENOL) tablet 650 mg  650 mg Oral Once Leonel Westbrook DO        lactated ringers IV soln infusion   IntraVENous Continuous Leonel Westbrook DO        albuterol (PROVENTIL) (2.5 MG/3ML) 0.083% nebulizer solution 2.5 mg  2.5 mg Nebulization Once PRN Leonel Westbrook DO           Allergies:    Allergies   Allergen Reactions    Beef Allergy Anaphylaxis     2016    Macadamia Nut Oil Anaphylaxis     Other reaction(s): Unknown (comments)  Allergy tested.  Pecans and brazille nuts    Oxycodone-Acetaminophen Itching     Pt states not allergic to medication    Codeine Nausea And Vomiting    Metronidazole Other (See Comments)     c-diff    Other      avocado

## 2024-07-16 NOTE — DISCHARGE INSTRUCTIONS
DISCHARGE SUMMARY from your Nurse    The following personal items collected during your admission are returned to you:   Dental Appliance:    Vision:    Hearing Aid:    Jewelry:    Clothing:    Other Valuables:    Valuables sent to safe: Dose (mL/hr) Propofol : *150 mg      PATIENT INSTRUCTIONS:    After general anesthesia or intravenous sedation, for 24 hours or while taking prescription Narcotics:  Limit your activities  Do not drive and operate hazardous machinery  Do not make important personal or business decisions  Do  not drink alcoholic beverages  If there is redness at the IV site you should apply a warm compress to the area.  If redness or soreness persist call your primary care physician.      These are general instructions for a healthy lifestyle:    *  Please give a list of your current medications to your Primary Care Provider.  *  Please update this list whenever your medications are discontinued, doses are      changed, or new medications (including over-the-counter products) are added.  *  Please carry medication information at all times in case of emergency situations.    No smoking / No tobacco products / Avoid exposure to second hand smoke    Surgeon General's Warning:  Quitting smoking now greatly reduces serious risk to your health.    Obesity, smoking, and sedentary lifestyle greatly increases your risk for illness and disease.  A healthy diet, regular physical exercise & weight monitoring are important for maintaining a healthy lifestyle.      Congestive Heart Failure  You may be retaining fluid if you have a history of heart failure or if you experience any of the following symptoms:  Weight gain of 3 pounds or more overnight or 5 pounds in a week, increased swelling in our hands or feet or shortness of breath while lying flat in bed.  Please call your doctor as soon as you notice any of these symptoms; do not wait until your next office visit.    Recognize signs and symptoms of STROKE:  F -

## 2024-07-16 NOTE — OP NOTE
[facility]          [address] [place]  [address]                            OPERATIVE REPORT      PATIENT NAME: MILENA ALVARENGA               : 1970  MED REC NO: [id number]                       ROOM: [facility]  ACCOUNT NO: [id number]                       ADMIT DATE: 2024  PROVIDER: Leonel Boone Jr, MD    DATE OF SERVICE:  2024    PREOPERATIVE DIAGNOSES:  Right breast mass consistent with a large area of fat necrosis below the lumpectomy scar.    POSTOPERATIVE DIAGNOSES:  Right breast mass consistent with a large area of fat necrosis below the lumpectomy scar.    PROCEDURES PERFORMED:  Excisional biopsy of right breast mass.    SURGEON:  Leonel Boone Jr, MD    ASSISTANT:  Debra Salinas.    ANESTHESIA:  General.    ESTIMATED BLOOD LOSS:  Minimal.    SPECIMENS REMOVED:  Right breast mass.    INTRAOPERATIVE FINDINGS:  There is a large area of fat necrosis, which extended deep and superior to the superficial area that was palpated while making this a little bit larger procedure than normal.     COMPLICATIONS:  None.    IMPLANTS:  None.    INDICATIONS:  The patient is a 54-year-old female status post lumpectomy and radiation therapy to the right breast.  There is a large area of fat necrosis below the lumpectomy scar, which is very symptomatic.    DESCRIPTION OF PROCEDURE:  After the satisfactory induction of general LMA anesthesia, the patient was prepped and draped in a sterile fashion.  An elliptical incision was made around the area of fat necrosis below the lumpectomy scar, the upper right breast and deepened through the subcutaneous tissue with Bovie cautery, and all calcified areas were excised completely.  The wound was hemostatic. It was anesthetized with 0.5% Marcaine.  It was closed with 2 inner layers of 3-0 Vicryl and a running subcuticular 4-0 Monocryl on skin.  The patient tolerated the procedure well with no complications.

## 2024-07-16 NOTE — H&P
MOUTH TWICE DAILY 180 tablet 1    venlafaxine (EFFEXOR) 37.5 MG tablet Take 1 tablet by mouth daily 90 tablet 3    diclofenac (VOLTAREN) 75 MG EC tablet TAKE 1 TABLET BY MOUTH TWICE DAILY AS NEEDED FOR JOINT PAIN 60 tablet 1    pantoprazole (PROTONIX) 40 MG tablet Take 1 tablet by mouth daily 30 tablet 3      No current facility-administered medications on file prior to visit.               Allergies   Allergen Reactions    Oxycodone-Acetaminophen Itching       Pt states not allergic to medication    Codeine Nausea And Vomiting    Macadamia Nut Oil         Other reaction(s): Unknown (comments)  Allergy tested.    Metronidazole Other (See Comments)       c-diff         OB History            1    Para        Term                AB        Living   1           SAB        IAB        Ectopic        Molar        Multiple        Live Births                       Review of Systems     Physical Exam  Exam conducted with a chaperone present.   Constitutional:       Appearance: She is not diaphoretic.   HENT:      Head: Normocephalic and atraumatic.      Right Ear: External ear normal.      Left Ear: External ear normal.   Eyes:      General: No scleral icterus.        Right eye: No discharge.         Left eye: No discharge.      Pupils: Pupils are equal, round, and reactive to light.   Cardiovascular:      Rate and Rhythm: Normal rate and regular rhythm.   Pulmonary:      Effort: Pulmonary effort is normal. No respiratory distress.      Breath sounds: Normal breath sounds. No stridor.   Chest:   Breasts:     Right: Tenderness present.        Abdominal:      General: There is no distension.      Palpations: Abdomen is soft. There is no mass.      Tenderness: There is no abdominal tenderness.   Musculoskeletal:         General: Normal range of motion.      Cervical back: Normal range of motion and neck supple.   Lymphadenopathy:      Cervical: No cervical adenopathy.   Skin:     General: Skin is warm.

## 2024-07-17 ENCOUNTER — TELEPHONE (OUTPATIENT)
Age: 54
End: 2024-07-17

## 2024-07-17 NOTE — TELEPHONE ENCOUNTER
Pt was called for post op call. LVM informing pt for reason of call. And to contact the office if she needs anything

## 2024-07-22 ENCOUNTER — TELEPHONE (OUTPATIENT)
Age: 54
End: 2024-07-22

## 2024-08-23 ENCOUNTER — HOSPITAL ENCOUNTER (EMERGENCY)
Facility: HOSPITAL | Age: 54
Discharge: HOME | End: 2024-08-23
Attending: EMERGENCY MEDICINE
Payer: MEDICARE

## 2024-08-23 ENCOUNTER — APPOINTMENT (OUTPATIENT)
Facility: HOSPITAL | Age: 54
End: 2024-08-23
Payer: MEDICARE

## 2024-08-23 VITALS
OXYGEN SATURATION: 96 % | DIASTOLIC BLOOD PRESSURE: 62 MMHG | BODY MASS INDEX: 29.62 KG/M2 | SYSTOLIC BLOOD PRESSURE: 118 MMHG | TEMPERATURE: 99.6 F | HEIGHT: 69 IN | HEART RATE: 88 BPM | WEIGHT: 200 LBS | RESPIRATION RATE: 18 BRPM

## 2024-08-23 DIAGNOSIS — U07.1 COVID-19: Primary | ICD-10-CM

## 2024-08-23 DIAGNOSIS — S91.331A PUNCTURE WOUND OF PLANTAR ASPECT OF RIGHT FOOT, INITIAL ENCOUNTER: ICD-10-CM

## 2024-08-23 LAB
ALBUMIN SERPL-MCNC: 4.3 G/DL (ref 3.5–5.2)
ALBUMIN/GLOB SERPL: 1.3 (ref 1.1–2.2)
ALP SERPL-CCNC: 112 U/L (ref 35–104)
ALT SERPL-CCNC: 23 U/L (ref 10–35)
ANION GAP SERPL CALC-SCNC: 11 MMOL/L (ref 5–15)
AST SERPL-CCNC: 33 U/L (ref 10–35)
BASOPHILS # BLD: 0 K/UL (ref 0–1)
BASOPHILS NFR BLD: 1 % (ref 0–1)
BILIRUB SERPL-MCNC: 0.4 MG/DL (ref 0.2–1)
BUN SERPL-MCNC: 9 MG/DL (ref 6–20)
BUN/CREAT SERPL: 12 (ref 12–20)
CALCIUM SERPL-MCNC: 9.5 MG/DL (ref 8.6–10)
CHLORIDE SERPL-SCNC: 102 MMOL/L (ref 98–107)
CO2 SERPL-SCNC: 24 MMOL/L (ref 22–29)
COMMENT:: NORMAL
CREAT SERPL-MCNC: 0.77 MG/DL (ref 0.5–0.9)
DIFFERENTIAL METHOD BLD: ABNORMAL
EOSINOPHIL # BLD: 0.1 K/UL (ref 0–0.4)
EOSINOPHIL NFR BLD: 1 %
ERYTHROCYTE [DISTWIDTH] IN BLOOD BY AUTOMATED COUNT: 11.9 % (ref 11.5–14.5)
FLUAV RNA SPEC QL NAA+PROBE: NOT DETECTED
FLUBV RNA SPEC QL NAA+PROBE: NOT DETECTED
GLOBULIN SER CALC-MCNC: 3.2 G/DL (ref 2–4)
GLUCOSE SERPL-MCNC: 96 MG/DL (ref 65–100)
HCT VFR BLD AUTO: 43.1 % (ref 35–47)
HGB BLD-MCNC: 14.8 G/DL (ref 11.5–16)
IMM GRANULOCYTES # BLD AUTO: 0 K/UL (ref 0–0.04)
IMM GRANULOCYTES NFR BLD AUTO: 0 % (ref 0–0.5)
LACTATE SERPL-SCNC: 1.6 MMOL/L (ref 0.4–2)
LYMPHOCYTES # BLD: 0.8 K/UL (ref 0.8–3.5)
LYMPHOCYTES NFR BLD: 12 % (ref 12–49)
MCH RBC QN AUTO: 31 PG (ref 26–34)
MCHC RBC AUTO-ENTMCNC: 34.3 G/DL (ref 30–36.5)
MCV RBC AUTO: 90.4 FL (ref 80–99)
MONOCYTES # BLD: 0.7 K/UL (ref 0–1)
MONOCYTES NFR BLD: 10 % (ref 5–13)
NEUTS SEG # BLD: 5.1 K/UL (ref 1.8–8)
NEUTS SEG NFR BLD: 76 % (ref 32–75)
NRBC # BLD: 0 K/UL (ref 0–0.01)
NRBC BLD-RTO: 0 PER 100 WBC
PLATELET # BLD AUTO: 205 K/UL (ref 150–400)
PMV BLD AUTO: 10.2 FL (ref 8.9–12.9)
POTASSIUM SERPL-SCNC: 3.8 MMOL/L (ref 3.5–5.1)
PROT SERPL-MCNC: 7.5 G/DL (ref 6.4–8.3)
RBC # BLD AUTO: 4.77 M/UL (ref 3.8–5.2)
SARS-COV-2 RNA RESP QL NAA+PROBE: DETECTED
SODIUM SERPL-SCNC: 137 MMOL/L (ref 136–145)
SOURCE: ABNORMAL
SPECIMEN HOLD: NORMAL
TROPONIN T SERPL HS-MCNC: <6 NG/L (ref 0–14)
WBC # BLD AUTO: 6.7 K/UL (ref 3.6–11)

## 2024-08-23 PROCEDURE — 90714 TD VACC NO PRESV 7 YRS+ IM: CPT | Performed by: EMERGENCY MEDICINE

## 2024-08-23 PROCEDURE — 87636 SARSCOV2 & INF A&B AMP PRB: CPT

## 2024-08-23 PROCEDURE — 83605 ASSAY OF LACTIC ACID: CPT

## 2024-08-23 PROCEDURE — 96374 THER/PROPH/DIAG INJ IV PUSH: CPT

## 2024-08-23 PROCEDURE — 84484 ASSAY OF TROPONIN QUANT: CPT

## 2024-08-23 PROCEDURE — 70450 CT HEAD/BRAIN W/O DYE: CPT

## 2024-08-23 PROCEDURE — 6360000002 HC RX W HCPCS: Performed by: EMERGENCY MEDICINE

## 2024-08-23 PROCEDURE — 96375 TX/PRO/DX INJ NEW DRUG ADDON: CPT

## 2024-08-23 PROCEDURE — 6370000000 HC RX 637 (ALT 250 FOR IP): Performed by: EMERGENCY MEDICINE

## 2024-08-23 PROCEDURE — 87040 BLOOD CULTURE FOR BACTERIA: CPT

## 2024-08-23 PROCEDURE — 99284 EMERGENCY DEPT VISIT MOD MDM: CPT

## 2024-08-23 PROCEDURE — 80053 COMPREHEN METABOLIC PANEL: CPT

## 2024-08-23 PROCEDURE — 85025 COMPLETE CBC W/AUTO DIFF WBC: CPT

## 2024-08-23 PROCEDURE — 36415 COLL VENOUS BLD VENIPUNCTURE: CPT

## 2024-08-23 PROCEDURE — 90471 IMMUNIZATION ADMIN: CPT | Performed by: EMERGENCY MEDICINE

## 2024-08-23 PROCEDURE — 2580000003 HC RX 258: Performed by: EMERGENCY MEDICINE

## 2024-08-23 RX ORDER — ACETAMINOPHEN 500 MG
1000 TABLET ORAL
Status: COMPLETED | OUTPATIENT
Start: 2024-08-23 | End: 2024-08-23

## 2024-08-23 RX ORDER — KETOROLAC TROMETHAMINE 30 MG/ML
15 INJECTION, SOLUTION INTRAMUSCULAR; INTRAVENOUS ONCE
Status: COMPLETED | OUTPATIENT
Start: 2024-08-23 | End: 2024-08-23

## 2024-08-23 RX ORDER — DOXYCYCLINE HYCLATE 100 MG/1
100 CAPSULE ORAL 2 TIMES DAILY
Qty: 14 CAPSULE | Refills: 0 | Status: SHIPPED | OUTPATIENT
Start: 2024-08-23 | End: 2024-08-30

## 2024-08-23 RX ORDER — 0.9 % SODIUM CHLORIDE 0.9 %
1000 INTRAVENOUS SOLUTION INTRAVENOUS ONCE
Status: COMPLETED | OUTPATIENT
Start: 2024-08-23 | End: 2024-08-23

## 2024-08-23 RX ADMIN — SODIUM CHLORIDE 1000 ML: 9 INJECTION, SOLUTION INTRAVENOUS at 16:19

## 2024-08-23 RX ADMIN — WATER 2000 MG: 1 INJECTION INTRAMUSCULAR; INTRAVENOUS; SUBCUTANEOUS at 16:18

## 2024-08-23 RX ADMIN — CLOSTRIDIUM TETANI TOXOID ANTIGEN (FORMALDEHYDE INACTIVATED) AND CORYNEBACTERIUM DIPHTHERIAE TOXOID ANTIGEN (FORMALDEHYDE INACTIVATED) 0.5 ML: 5; 2 INJECTION, SUSPENSION INTRAMUSCULAR at 16:21

## 2024-08-23 RX ADMIN — ACETAMINOPHEN 1000 MG: 500 TABLET ORAL at 16:16

## 2024-08-23 RX ADMIN — KETOROLAC TROMETHAMINE 15 MG: 30 INJECTION, SOLUTION INTRAMUSCULAR at 17:03

## 2024-08-23 ASSESSMENT — ENCOUNTER SYMPTOMS
SORE THROAT: 0
BACK PAIN: 0
SHORTNESS OF BREATH: 0
ABDOMINAL PAIN: 0
CHEST TIGHTNESS: 0
VOMITING: 0
EYE PAIN: 0

## 2024-08-23 ASSESSMENT — LIFESTYLE VARIABLES
HOW OFTEN DO YOU HAVE A DRINK CONTAINING ALCOHOL: NEVER
HOW MANY STANDARD DRINKS CONTAINING ALCOHOL DO YOU HAVE ON A TYPICAL DAY: PATIENT DOES NOT DRINK

## 2024-08-23 ASSESSMENT — PAIN SCALES - GENERAL: PAINLEVEL_OUTOF10: 8

## 2024-08-23 ASSESSMENT — PAIN DESCRIPTION - ORIENTATION: ORIENTATION: MID

## 2024-08-23 ASSESSMENT — PAIN - FUNCTIONAL ASSESSMENT: PAIN_FUNCTIONAL_ASSESSMENT: 0-10

## 2024-08-23 ASSESSMENT — PAIN DESCRIPTION - LOCATION: LOCATION: HEAD;GENERALIZED

## 2024-08-23 ASSESSMENT — PAIN DESCRIPTION - DESCRIPTORS: DESCRIPTORS: SPASM

## 2024-08-23 NOTE — ED TRIAGE NOTES
Pt arrives in the ED via EMS for complaints of generalized body aches/spasms starting early this morning.  Reports severe migraine starting last night.  Chills starting this morning.  Pt reports stepping on russ tac 2 days ago and is fearful that she may have Tetanus.

## 2024-08-23 NOTE — ED PROVIDER NOTES
IMPRESSION:  No acute intracranial abnormalities.   [BN]   1800 Patient reevaluated this time.  Her vital signs are stable.  She would like me to prescribe her Paxlovid.  Additionally I will send her in doxycycline to cover her for the recent puncture wound of her right foot. [BN]      ED Course User Index  [BN] Clifton Turner MD         CONSULTS:  None    PROCEDURES:     Procedures        (Please note that portions of this note were completed with a voice recognition program.  Efforts were made to edit the dictations but occasionally words are mis-transcribed.)    Clifton Turner MD (electronically signed)  Emergency Attending Physician              Clifton Turner MD  08/23/24 [id number]

## 2024-08-25 LAB
BACTERIA SPEC CULT: NORMAL
SERVICE CMNT-IMP: NORMAL

## 2024-08-28 LAB
BACTERIA SPEC CULT: NORMAL
SERVICE CMNT-IMP: NORMAL

## 2024-10-25 ENCOUNTER — TELEPHONE (OUTPATIENT)
Age: 54
End: 2024-10-25

## 2024-10-25 NOTE — TELEPHONE ENCOUNTER
We received a fax refill request for Josefina Miller.  Please escribe Lorazepam/Ativan to their pharmacy.  The pharmacy is correct in the chart and they are requesting a 30 day supply.

## 2024-12-01 RX ORDER — DICLOFENAC SODIUM 75 MG/1
TABLET, DELAYED RELEASE ORAL
Qty: 60 TABLET | Refills: 1 | Status: SHIPPED | OUTPATIENT
Start: 2024-12-01

## 2025-01-17 DIAGNOSIS — F31.62 BIPOLAR DISORDER, CURRENT EPISODE MIXED, MODERATE (HCC): ICD-10-CM

## 2025-01-20 RX ORDER — VENLAFAXINE 37.5 MG/1
37.5 TABLET ORAL DAILY
Qty: 90 TABLET | Refills: 1 | Status: SHIPPED | OUTPATIENT
Start: 2025-01-20

## 2025-06-21 ENCOUNTER — APPOINTMENT (OUTPATIENT)
Facility: HOSPITAL | Age: 55
End: 2025-06-21
Payer: MEDICARE

## 2025-06-21 ENCOUNTER — HOSPITAL ENCOUNTER (EMERGENCY)
Facility: HOSPITAL | Age: 55
Discharge: HOME | End: 2025-06-21
Attending: STUDENT IN AN ORGANIZED HEALTH CARE EDUCATION/TRAINING PROGRAM
Payer: MEDICARE

## 2025-06-21 VITALS
OXYGEN SATURATION: 96 % | BODY MASS INDEX: 33.14 KG/M2 | HEART RATE: 69 BPM | DIASTOLIC BLOOD PRESSURE: 103 MMHG | RESPIRATION RATE: 18 BRPM | SYSTOLIC BLOOD PRESSURE: 192 MMHG | TEMPERATURE: 98.4 F | WEIGHT: 223.77 LBS | HEIGHT: 69 IN

## 2025-06-21 DIAGNOSIS — I16.0 HYPERTENSIVE URGENCY: Primary | ICD-10-CM

## 2025-06-21 LAB
ALBUMIN SERPL-MCNC: 4.1 G/DL (ref 3.5–5.2)
ALBUMIN/GLOB SERPL: 1.2 (ref 1.1–2.2)
ALP SERPL-CCNC: 104 U/L (ref 35–104)
ALT SERPL-CCNC: 16 U/L (ref 10–35)
ANION GAP SERPL CALC-SCNC: 12 MMOL/L (ref 2–12)
APPEARANCE UR: CLEAR
AST SERPL-CCNC: 18 U/L (ref 10–35)
BACTERIA URNS QL MICRO: ABNORMAL /HPF
BASOPHILS # BLD: 0.07 K/UL (ref 0–0.1)
BASOPHILS NFR BLD: 1 % (ref 0–1)
BILIRUB SERPL-MCNC: 0.4 MG/DL (ref 0.2–1)
BILIRUB UR QL: NEGATIVE
BUN SERPL-MCNC: 7 MG/DL (ref 6–20)
BUN/CREAT SERPL: 10 (ref 12–20)
CALCIUM SERPL-MCNC: 9.5 MG/DL (ref 8.6–10)
CHLORIDE SERPL-SCNC: 105 MMOL/L (ref 98–107)
CO2 SERPL-SCNC: 24 MMOL/L (ref 22–29)
COLOR UR: ABNORMAL
COMMENT:: NORMAL
CREAT SERPL-MCNC: 0.71 MG/DL (ref 0.5–0.9)
D DIMER PPP FEU-MCNC: <0.27 UG/ML(FEU)
DIFFERENTIAL METHOD BLD: ABNORMAL
EOSINOPHIL # BLD: 0.14 K/UL (ref 0–0.4)
EOSINOPHIL NFR BLD: 1.9 % (ref 0–7)
EPITH CASTS URNS QL MICRO: ABNORMAL /LPF
ERYTHROCYTE [DISTWIDTH] IN BLOOD BY AUTOMATED COUNT: 12.3 % (ref 11.5–14.5)
GLOBULIN SER CALC-MCNC: 3.3 G/DL (ref 2–4)
GLUCOSE SERPL-MCNC: 105 MG/DL (ref 65–100)
GLUCOSE UR STRIP.AUTO-MCNC: NEGATIVE MG/DL
HCG, URINE, POC: NEGATIVE
HCT VFR BLD AUTO: 45.4 % (ref 35–47)
HGB BLD-MCNC: 15.8 G/DL (ref 11.5–16)
HGB UR QL STRIP: NEGATIVE
IMM GRANULOCYTES # BLD AUTO: 0.02 K/UL (ref 0–0.04)
IMM GRANULOCYTES NFR BLD AUTO: 0.3 % (ref 0–0.5)
KETONES UR QL STRIP.AUTO: NEGATIVE MG/DL
LEUKOCYTE ESTERASE UR QL STRIP.AUTO: NEGATIVE
LYMPHOCYTES # BLD: 3.6 K/UL (ref 0.8–3.5)
LYMPHOCYTES NFR BLD: 48.9 % (ref 12–49)
Lab: NORMAL
MCH RBC QN AUTO: 31.5 PG (ref 26–34)
MCHC RBC AUTO-ENTMCNC: 34.8 G/DL (ref 30–36.5)
MCV RBC AUTO: 90.6 FL (ref 80–99)
MONOCYTES # BLD: 0.4 K/UL (ref 0–1)
MONOCYTES NFR BLD: 5.4 % (ref 5–13)
MUCOUS THREADS URNS QL MICRO: ABNORMAL /LPF
NEGATIVE QC PASS/FAIL: NORMAL
NEUTS SEG # BLD: 3.13 K/UL (ref 1.8–8)
NEUTS SEG NFR BLD: 42.5 % (ref 32–75)
NITRITE UR QL STRIP.AUTO: NEGATIVE
NRBC # BLD: 0 K/UL (ref 0–0.01)
NRBC BLD-RTO: 0 PER 100 WBC
NT PRO BNP: 40 PG/ML
PH UR STRIP: 6 (ref 5–8)
PLATELET # BLD AUTO: 226 K/UL (ref 150–400)
PMV BLD AUTO: 10.1 FL (ref 8.9–12.9)
POSITIVE QC PASS/FAIL: NORMAL
POTASSIUM SERPL-SCNC: 3.9 MMOL/L (ref 3.5–5.1)
PROT SERPL-MCNC: 7.4 G/DL (ref 6.4–8.3)
PROT UR STRIP-MCNC: NEGATIVE MG/DL
RBC # BLD AUTO: 5.01 M/UL (ref 3.8–5.2)
RBC #/AREA URNS HPF: ABNORMAL /HPF
SODIUM SERPL-SCNC: 141 MMOL/L (ref 136–145)
SP GR UR REFRACTOMETRY: 1.01 (ref 1–1.03)
SPECIMEN HOLD: NORMAL
SPECIMEN HOLD: NORMAL
TROPONIN T SERPL HS-MCNC: <6 NG/L (ref 0–14)
TROPONIN T SERPL HS-MCNC: <6 NG/L (ref 0–14)
UROBILINOGEN UR QL STRIP.AUTO: 0.2 EU/DL (ref 0.2–1)
WBC # BLD AUTO: 7.4 K/UL (ref 3.6–11)
WBC URNS QL MICRO: ABNORMAL /HPF (ref 0–4)

## 2025-06-21 PROCEDURE — 85025 COMPLETE CBC W/AUTO DIFF WBC: CPT

## 2025-06-21 PROCEDURE — 99285 EMERGENCY DEPT VISIT HI MDM: CPT

## 2025-06-21 PROCEDURE — 84484 ASSAY OF TROPONIN QUANT: CPT

## 2025-06-21 PROCEDURE — 71046 X-RAY EXAM CHEST 2 VIEWS: CPT

## 2025-06-21 PROCEDURE — 80053 COMPREHEN METABOLIC PANEL: CPT

## 2025-06-21 PROCEDURE — 36415 COLL VENOUS BLD VENIPUNCTURE: CPT

## 2025-06-21 PROCEDURE — 6370000000 HC RX 637 (ALT 250 FOR IP): Performed by: STUDENT IN AN ORGANIZED HEALTH CARE EDUCATION/TRAINING PROGRAM

## 2025-06-21 PROCEDURE — 81001 URINALYSIS AUTO W/SCOPE: CPT

## 2025-06-21 PROCEDURE — 85379 FIBRIN DEGRADATION QUANT: CPT

## 2025-06-21 PROCEDURE — 81025 URINE PREGNANCY TEST: CPT

## 2025-06-21 PROCEDURE — 83880 ASSAY OF NATRIURETIC PEPTIDE: CPT

## 2025-06-21 PROCEDURE — 93005 ELECTROCARDIOGRAM TRACING: CPT | Performed by: STUDENT IN AN ORGANIZED HEALTH CARE EDUCATION/TRAINING PROGRAM

## 2025-06-21 RX ORDER — NIFEDIPINE 30 MG/1
30 TABLET, EXTENDED RELEASE ORAL ONCE
Status: COMPLETED | OUTPATIENT
Start: 2025-06-21 | End: 2025-06-21

## 2025-06-21 RX ORDER — AMLODIPINE BESYLATE 5 MG/1
5 TABLET ORAL DAILY
Qty: 30 TABLET | Refills: 0 | Status: SHIPPED | OUTPATIENT
Start: 2025-06-21 | End: 2025-06-21

## 2025-06-21 RX ORDER — LISINOPRIL 10 MG/1
20 TABLET ORAL DAILY
Qty: 60 TABLET | Refills: 0 | Status: SHIPPED | OUTPATIENT
Start: 2025-06-21

## 2025-06-21 RX ORDER — ACETAMINOPHEN 500 MG
1000 TABLET ORAL
Status: COMPLETED | OUTPATIENT
Start: 2025-06-21 | End: 2025-06-21

## 2025-06-21 RX ADMIN — ACETAMINOPHEN 1000 MG: 500 TABLET ORAL at 17:45

## 2025-06-21 RX ADMIN — NIFEDIPINE 30 MG: 30 TABLET, FILM COATED, EXTENDED RELEASE ORAL at 17:45

## 2025-06-21 ASSESSMENT — PAIN DESCRIPTION - DESCRIPTORS: DESCRIPTORS: ACHING

## 2025-06-21 ASSESSMENT — PAIN DESCRIPTION - LOCATION: LOCATION: GENERALIZED

## 2025-06-21 ASSESSMENT — PAIN SCALES - GENERAL: PAINLEVEL_OUTOF10: 6

## 2025-06-21 ASSESSMENT — PAIN - FUNCTIONAL ASSESSMENT: PAIN_FUNCTIONAL_ASSESSMENT: 0-10

## 2025-06-21 NOTE — ED NOTES
Provider at bedside.   Hx of fibromyalgia, fatty liver disease, TIA (2yr ago), vision problems, cervical sx.   Patient states she took her BP yesterday and it was high; again today. +Headache.  Also states her abd is swollen after every meal.   Also states fatigue.   RLE, LUE, and Left facial tingling started today with left neck strain/tingling worse with movement.   NIH completed at bedside.

## 2025-06-21 NOTE — ED PROVIDER NOTES
[facility]  EMERGENCY DEPARTMENT ENCOUNTER      Pt Name: Josefina Miller  MRN: [id number]  Birthdate 1970  Date of evaluation: 6/21/2025  Provider: Fadi Nunez PA-C    CHIEF COMPLAINT       Chief Complaint   Patient presents with    Hypertension    Numbness         HISTORY OF PRESENT ILLNESS   (Location/Symptom, Timing/Onset, Context/Setting, Quality, Duration, Modifying Factors, Severity)  Note limiting factors.   HPI  55-year-old female past medical history of fibromyalgia, fatty liver disease, TIA, cervical surgery presenting with concern for high blood pressure, numbness, fatigue, bloating.  Reports for many months now she has felt increasing fatigue, bloating after meals, headaches.  She This morning with right lower extremity numbness, tingling as well as the left upper extremity, left side of the face.  She also has left neck pain on the left side worse with movement.  She reports having a headache, has been having headaches intermittently for some time now.  Denies chest pain.  Does report exertional shortness of breath.    Review of External Medical Records:     Nursing Notes were reviewed.    REVIEW OF SYSTEMS    (2-9 systems for level 4, 10 or more for level 5)     Review of Systems    Except as noted above the remainder of the review of systems was reviewed and negative.       PAST MEDICAL HISTORY     Past Medical History:   Diagnosis Date    ADHD (attention deficit hyperactivity disorder)     Arthritis     Asthma     CAD (coronary artery disease)     hardening of aortic valve    Cancer (HCC) 2002    right breast    Chronic pain     back, legs, hips, knees, neck    Colitis     DDD (degenerative disc disease), cervical     lumbar and cervical    Fatty liver 2019    Fibromyalgia 2019    GERD (gastroesophageal reflux disease)     History of chemotherapy     [place] (hard of hearing)     bilateral    Hyperlipidemia     Insomnia     Morbid obesity (HCC)     Other ill-defined
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
## 2025-06-21 NOTE — ED TRIAGE NOTES
Pt ambulated to ED.  Pt reports having HTN and \"not feeling good for the past couple of months.\"  Pt took a wrist BP of 220/148 pta.  Pt denies being on HTN medications since 2018 because \"it made my face feel bad.\"  Pt unable to name the medication.      Pt reports tingling in L arm, R leg, L side of face, having extreme fatigue, and dyspnea with exertion.  Pt also reports abd inflammation due \"to my fatty liver.\"

## 2025-06-22 LAB
EKG ATRIAL RATE: 69 BPM
EKG DIAGNOSIS: NORMAL
EKG P AXIS: 27 DEGREES
EKG P-R INTERVAL: 168 MS
EKG Q-T INTERVAL: 388 MS
EKG QRS DURATION: 86 MS
EKG QTC CALCULATION (BAZETT): 415 MS
EKG R AXIS: 25 DEGREES
EKG T AXIS: 35 DEGREES
EKG VENTRICULAR RATE: 69 BPM

## 2025-06-22 PROCEDURE — 93010 ELECTROCARDIOGRAM REPORT: CPT | Performed by: INTERNAL MEDICINE

## 2025-06-22 NOTE — ED NOTES
Pt given discharge instructions, pt education, 1 prescriptions and follow up information. Pt verbalizes understanding. All questions answered. BP outside defined limits. MD aware. No new orders. Pt discharged to home in ride share, ambulatory. Pt A&O x4, RA, pain controlled.

## 2025-06-23 LAB — HCG UR QL: NEGATIVE

## 2025-06-25 ENCOUNTER — TELEPHONE (OUTPATIENT)
Facility: CLINIC | Age: 55
End: 2025-06-25

## 2025-06-25 NOTE — TELEPHONE ENCOUNTER
ERICA: Spoke with patient and she doesn't want to do in person because she doesn't have transportation. Patient stated she is on disability and struggles with getting rides to and from appointments. Patient stated she is going to walk across the street to the [facility] to schedule an appointment with a new provider.

## 2025-06-25 NOTE — TELEPHONE ENCOUNTER
LM for the patient to call us back to schedule an in person appointment asap due to her VV not working with the phone breaking up too much.

## 2025-07-01 ENCOUNTER — TELEPHONE (OUTPATIENT)
Facility: CLINIC | Age: 55
End: 2025-07-01

## 2025-07-01 NOTE — TELEPHONE ENCOUNTER
At last contact with patient she stated that she wishes to establish care at [facility] because that it closer to home and she has transportation issues.

## 2025-07-01 NOTE — TELEPHONE ENCOUNTER
----- Message from MARCIA Green NP sent at 7/1/2025  2:26 PM EDT -----  Regarding: FW: [facility] Appointment Request    ----- Message -----  From: Justa Hernandez CMA  Sent: 7/1/2025   1:58 PM EDT  To: MARCIA Alvarez NP  Subject: FW: [facility] Appointment Request                        ----- Message -----  From: Gerry Kwon  Sent: 7/1/2025  11:33 AM EDT  To: #  Subject: [facility] Appointment Request                          [facility] Appointment Request    Patient needs appointment for [facility] Appointment Type: New to Provider.    Patient Requested Dates(s): ASA    Patient Requested Time: Afternoon    Provider Name: Any provider with at the practice with the soonest appointment possible    Reason for Appointment Request: Established Patient - No appointments available during search    Wants to be establish at the practice for her HIGH BLOOD pressure + GUT issues + Medication prescriptions and REQUESTING for a referral for a GASTRO.  --------------------------------------------------------------------------------------------------------------------------    Relationship to Patient: Self     Call Back Information: OK to leave message on voicemail    Preferred Call Back Number: Phone [phone]

## 2025-07-07 ENCOUNTER — TELEMEDICINE (OUTPATIENT)
Dept: PRIMARY CARE CLINIC | Facility: CLINIC | Age: 55
End: 2025-07-07
Payer: MEDICARE

## 2025-07-07 DIAGNOSIS — I10 ESSENTIAL HYPERTENSION: Primary | ICD-10-CM

## 2025-07-07 DIAGNOSIS — F32.A DEPRESSION, UNSPECIFIED DEPRESSION TYPE: ICD-10-CM

## 2025-07-07 DIAGNOSIS — F31.62 BIPOLAR DISORDER, CURRENT EPISODE MIXED, MODERATE (HCC): ICD-10-CM

## 2025-07-07 PROCEDURE — 4004F PT TOBACCO SCREEN RCVD TLK: CPT | Performed by: NURSE PRACTITIONER

## 2025-07-07 PROCEDURE — 3017F COLORECTAL CA SCREEN DOC REV: CPT | Performed by: NURSE PRACTITIONER

## 2025-07-07 PROCEDURE — 99214 OFFICE O/P EST MOD 30 MIN: CPT | Performed by: NURSE PRACTITIONER

## 2025-07-07 PROCEDURE — G8427 DOCREV CUR MEDS BY ELIG CLIN: HCPCS | Performed by: NURSE PRACTITIONER

## 2025-07-07 PROCEDURE — G8417 CALC BMI ABV UP PARAM F/U: HCPCS | Performed by: NURSE PRACTITIONER

## 2025-07-07 RX ORDER — VENLAFAXINE HYDROCHLORIDE 75 MG/1
75 CAPSULE, EXTENDED RELEASE ORAL DAILY
Qty: 90 CAPSULE | Refills: 1 | Status: SHIPPED | OUTPATIENT
Start: 2025-07-07

## 2025-07-07 ASSESSMENT — ENCOUNTER SYMPTOMS
ABDOMINAL PAIN: 0
SHORTNESS OF BREATH: 0
BACK PAIN: 0

## 2025-07-07 NOTE — PROGRESS NOTES
Josefina Miller is a 55 y.o. female who was seen via telemedicine today 7/7/2025).    Assessment & Plan:   Below is the assessment and plan developed based on review of pertinent history, physical exam, labs, studies, and medications.    1. Essential hypertension  Comments:  not controlled. will increase lisinopril to 30mg, continue home monitoring with BP log. follow up in 2 weeks.  2. Depression, unspecified depression type  Comments:  not controlled. will increase Effexor to 75mg. She denies SI/HI. Will follow up in 2 weeks or sooner [facility]  Orders:  -     venlafaxine (EFFEXOR XR) 75 MG extended release capsule; Take 1 capsule by mouth daily, Disp-90 capsule, R-1Normal  3. Bipolar disorder, current episode mixed, moderate (HCC)  Assessment & Plan:   Monitored by specialist- no acute findings meriting change in the plan      PDMP Monitoring:    Last PDMP Alvin as Reviewed:  Review User Review Instant Review Result   GIANNA BALDERAS 7/7/2025  3:56 PM Reviewed PDMP [1]       No follow-ups on file.    Subjective:   Josefina was seen today for Hypertension     Patient reports her blood pressure has been high. Reports she was feeling bad, so she went to the ER on 6/21 and BP was high. She was put on lisinopril 10mg BID and has been monitoring her blood pressure at home since discharge. Her Bp is ranging 130s-150s and headaches as well. She has been keeping a BP log    Insomnia: patient reports she has tried many medications, ambien is the only thing that works for her. Trazodone makes her very groggy. When taking the ambien, she would sleep for 7-8 hours. Since stopping it, she only sleeps about 3 hours a night.    Depression: patient reports she does feel down/depressed. She is on Effexor, previously on 37.5mg and it worked well, but it has not been working for her. She denies SI/HI. She just feels fatigued, low energy, low motivation. Pt reports she has a racing mind, she was previously on lorazepam to help calm her down.

## 2025-07-16 ENCOUNTER — TELEPHONE (OUTPATIENT)
Dept: PRIMARY CARE CLINIC | Facility: CLINIC | Age: 55
End: 2025-07-16

## 2025-07-16 DIAGNOSIS — I10 ESSENTIAL HYPERTENSION: Primary | ICD-10-CM

## 2025-07-16 RX ORDER — LISINOPRIL 30 MG/1
30 TABLET ORAL DAILY
Qty: 90 TABLET | Refills: 1 | Status: SHIPPED | OUTPATIENT
Start: 2025-07-16

## 2025-07-16 NOTE — TELEPHONE ENCOUNTER
Josefina Miller is requesting a refill on Lisinopril 30mg .   Please send medication to: [facility] on file  Patient's last appointment was 7/7/2025   Next visit is scheduled for 7/21/2025

## (undated) DEVICE — SOLUTION IRRIG 500ML 0.9% SOD CHLO USP POUR PLAS BTL

## (undated) DEVICE — GLOVE ORANGE PI 7 1/2   MSG9075

## (undated) DEVICE — BLADE ES ELASTOMERIC COAT INSUL DURABLE BEND UPTO 90DEG

## (undated) DEVICE — HYPODERMIC SAFETY NEEDLE: Brand: MAGELLAN

## (undated) DEVICE — CHEST PACK-SFMCASU: Brand: MEDLINE INDUSTRIES, INC.

## (undated) DEVICE — TRANSFER SET 3": Brand: MEDLINE INDUSTRIES, INC.

## (undated) DEVICE — SOLUTION IRRIG 1000ML STRL H2O USP PLAS POUR BTL

## (undated) DEVICE — PENCIL SMK EVAC L10FT DIA95MM TBNG NONSTICK W ADPT TO 22MM